# Patient Record
Sex: FEMALE | Race: WHITE | NOT HISPANIC OR LATINO | Employment: FULL TIME | URBAN - METROPOLITAN AREA
[De-identification: names, ages, dates, MRNs, and addresses within clinical notes are randomized per-mention and may not be internally consistent; named-entity substitution may affect disease eponyms.]

---

## 2017-10-17 ENCOUNTER — OFFICE VISIT (OUTPATIENT)
Dept: SLEEP MEDICINE | Facility: CLINIC | Age: 32
End: 2017-10-17
Payer: COMMERCIAL

## 2017-10-17 VITALS
RESPIRATION RATE: 14 BRPM | BODY MASS INDEX: 23.34 KG/M2 | HEART RATE: 81 BPM | HEIGHT: 68 IN | OXYGEN SATURATION: 97 % | SYSTOLIC BLOOD PRESSURE: 125 MMHG | DIASTOLIC BLOOD PRESSURE: 80 MMHG | WEIGHT: 154 LBS

## 2017-10-17 DIAGNOSIS — R40.0 SLEEPINESS: ICD-10-CM

## 2017-10-17 DIAGNOSIS — G47.30 OBSERVED SLEEP APNEA: ICD-10-CM

## 2017-10-17 DIAGNOSIS — R06.83 SNORING: ICD-10-CM

## 2017-10-17 DIAGNOSIS — G47.8 SLEEP PARALYSIS: Primary | ICD-10-CM

## 2017-10-17 PROCEDURE — 99205 OFFICE O/P NEW HI 60 MIN: CPT | Performed by: PHYSICIAN ASSISTANT

## 2017-10-17 NOTE — PROGRESS NOTES
Sleep Consultation:    Date on this visit: 10/17/2017    Tracy Dalton is sent by No ref. provider found for a sleep consultation regarding sleep paralysis.    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 It is worsening. Her medical history is significant for depression and anxiety.     At least once a week, she will feel aware of what is going on around her but can't wake fully and can't move. She is sometimes unresponsive, sometimes talking gibberish. It lasts just a couple of minutes. When she was younger, it lasted longer. She tries to calm herself down by controlling her breathing. She usually wakes up and walks around otherwise she will fall right back into the same feeling. It happens more when she tries to go to bed earlier than usual. She does not usually wake later in the night with this feeling. She has not taken anything to help her sleep. The sleep paralysis happens more when she sleeps on her back, so she avoids that position. In the last few years, it has even occurred when sleeping in other positions. She is sometimes observed to snore during these events. She and her  are planning on starting a family and they are both concerned that she might not be able to wake up to the baby crying.     Tracy goes to sleep at 11:30 PM during the week. She frequently falls asleep on the couch before bedtime. Sleep paralysis happens there infrequently, normally in bed. She starts dozing around 10-11 PM. She wakes up at 5:30 AM with an alarm. She sometimes hits the snooze, but she has to tend to her dogs, so does not usually have an option of sleeping in. She falls asleep in 5 minutes.  Tracy denies difficulty falling asleep.  She wakes up 2-4 times a night for 5 minutes before falling back to sleep.  Tracy wakes up to sleep paralysis or her dogs.  On weekends, Tracy goes to sleep at 1:00 AM.  She wakes up at 6:00 AM to her dogs.  She denies having much trouble getting up at 6 AM. She rarely goes back to bed. She might doze on the couch, but usually her dogs keep her up. She falls asleep in 5 minutes.  Patient gets an average of 4-5 hours of sleep per week night and 5-6 hours on weekends.     Patient does use electronics in bed and read in bed (not long, she falls asleep quickly) and does not watch TV in bed (but it is on while she is falling asleep) and worry in bed about anything usually.     Tracy does not do shift work.  She works day shifts.  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 She lives with her  and 2 dogs. She moved here from [place] about 6.5 years ago.     Tracy does snore most nights and snoring is soft to moderate. Her snoring is mostly related to when she sleeps on her back. Patient does have a regular bed partner. She does have infrequent witnessed apneas with gasp arousals, or she will shout when she wakes. They never sleep separately.  Patient sleeps on her side and stomach. She has occasional gasp arousals (1-2 times per week), denies morning dry mouth, morning headaches (for the most part), morning confusion (unless she is woken in the middle of the night) and restless legs. Tracy denies any bruxism, sleep walking, dream enactment and cataplexy. She has very vivid dreams and sometimes has difficulty distinguishing what was a dream and what was real. She has vivid dreams soon after falling asleep. They are often stressful or nightmares. She sometimes feels she has been sleeping for hours, but it has only been 10 minutes. She often wakes with a start and has hit her . She talks gibberish in her sleep. When she was younger, she thought she heard or felt something coming into her room. That has not happened lately. She says that is because she has dogs and a , so there are other things to focus on.     She had a lot of trouble falling asleep in childhood, but would sleep well once  she fell asleep. She denies having daytime sleepiness as a child. Tracy has anxiety but feels it is much better now, denies difficulty breathing through her nose, claustrophobia, reflux at night, heartburn and depression.  She was on Celexa 3-4 years ago. It did not have an effect on her sleep paralysis.    Tracy has lost 5-10 pounds in the last 2 years.  Patient describes themself as either a morning or night person.  She would prefer to go to sleep at 12:00 AM and wake up at 6:30 AM.  Patient's [place] Sleepiness score 14/24 consistent with moderate daytime sleepiness.  She feels sleepiness in the daytime is not too much of an issue unless she had a bad night. She feels she is more fatigued if she gets over 6 hours of sleep.    Tracy denies taking naps. She used to sleep 20-30 minutes. Sometimes she dreams during naps. She will doze quickly whenever she lays down. She will fall asleep if laying down and reading or watching TV at any time of day.  She usually feels worse after naps. She does not like napping. She takes some inadvertant naps, mostly if she has a particularly disrupted night of sleep.  She denies dozing while driving unless she is driving several hours. Patient was counseled on the importance of driving while alert, to pull over if drowsy, or nap before getting into the vehicle if sleepy.  She uses 4-5 cups/day of coffee. Last caffeine intake can be as late as bedtime.    Allergies:    No Known Allergies    Medications:    No current outpatient prescriptions on file.       Problem List:  Patient Active Problem List    Diagnosis Date Noted     Major depressive disorder, recurrent episode, moderate (H) 11/19/2015     Priority: Medium     Anxiety 11/19/2015     Priority: Medium     CARDIOVASCULAR SCREENING; LDL GOAL LESS THAN 160 10/22/2015     Priority: Medium        Past Medical/Surgical History:  Past Medical History:   Diagnosis Date     Depressive disorder     and anxiety     Past Surgical  History:   Procedure Laterality Date     wisdom teeth         Social History:  Social History     Social History     Marital status:      Spouse name: N/A     Number of children: N/A     Years of education: N/A     Occupational History     Not on file.     Social History Main Topics     Smoking status: Former Smoker     Start date: 1/1/2008     Smokeless tobacco: Never Used      Comment: 1 ppw     Alcohol use Yes      Comment: 2-3 drinks on weekends     Drug use: No     Sexual activity: Yes     Partners: Male     Birth control/ protection: None     Other Topics Concern     Bike Helmet Yes     Seat Belt Yes     Social History Narrative       Family History:  Family History   Problem Relation Age of Onset     Type 1 Diabetes Other      Clotting Disorder (Unknown) Father      Coronary Artery Disease No family hx of      Hypertension No family hx of      CEREBROVASCULAR DISEASE No family hx of        Review of Systems:  A complete review of systems reviewed by me is negative with the exeption of what has been mentioned in the history of present illness.  CONSTITUTIONAL: NEGATIVE for weight gain/loss, fever, chills, sweats or night sweats, drug allergies.  EYES: NEGATIVE for changes in vision, blind spots, double vision.  ENT: NEGATIVE for ear pain, sore throat, sinus pain, post-nasal drip, runny nose, bloody nose  CARDIAC: NEGATIVE for fast heartbeats or fluttering in chest, chest pain or pressure, breathlessness when lying flat, swollen legs or swollen feet.  NEUROLOGIC: NEGATIVE headaches, weakness or numbness in the arms or legs.  DERMATOLOGIC: NEGATIVE for rashes, new moles or change in mole(s)  PULMONARY: NEGATIVE SOB at rest, SOB with activity, dry cough, productive cough, coughing up blood, wheezing or whistling when breathing.    GASTROINTESTINAL: NEGATIVE for vomitting, fat or grease in stools, constipation, abdominal pain, bowel movements black in color or blood noted.  GASTROINTESTINAL:  POSITIVE for   "nausea and loose or watery stools  GENITOURINARY: NEGATIVE for pain during urination, blood in urine, urinating more frequently than usual.  GENITOURINARY:  POSITIVE for  irregular menstrual periods  MUSCULOSKELETAL: NEGATIVE for muscle pain, bone or joint pain, swollen joints.  ENDOCRINE: NEGATIVE for increased thirst or urination, diabetes.  LYMPHATIC: NEGATIVE for swollen lymph nodes, lumps or bumps in the breasts or nipple discharge.  MENTAL HEALTH: POSITIVE for anxiety    Physical Examination:  Vitals: /80  Pulse 81  Resp 14  Ht 1.727 m (5' 8\")  Wt 69.9 kg (154 lb)  SpO2 97%  BMI 23.42 kg/m2  BMI= Body mass index is 23.42 kg/(m^2).    Neck Cir (cm): 35 cm    [place] Total Score 10/17/2017   Total score - [place] 14       GENERAL APPEARANCE: healthy, alert, no distress and cooperative  EYES: Eyes grossly normal to inspection, PERRL, conjunctivae and sclerae normal and lids and lashes normal  HENT: nose and mouth without ulcers or lesions, oropharynx small/crowded and tongue base enlarged, nasal septum slightly deviated  NECK: no adenopathy, no asymmetry, masses, or scars, thyroid normal to palpation and trachea midline and normal to palpation  RESP: lungs clear to auscultation - no rales, rhonchi or wheezes  CV: regular rates and rhythm, normal S1 S2, no S3 or S4, no murmur, click or rub and no irregular beats  LYMPHATICS: normal ant/post cervical and supraclavicular nodes  MS: extremities normal- no gross deformities noted  NEURO: Normal strength and tone, mentation intact, speech normal and cranial nerves 2-12 intact  Mallampati Class: IV.  Tonsillar Stage: 1  hidden by pillars.    Impression/Plan:    (G47.8) Sleep paralysis  (primary encounter diagnosis)  Comment: She has sleep paralysis at sleep onset 1-2 times per week since childhood. It is more likely when she sleeps supine, which she tries to avoid. It also occurs if she tries to go to bed early.  In the last 3 months, it has been happening " in other positions. She sometimes but not always is observed to snore and terminate the sleep paralysis with a gasp awakening. She says the sleep paralysis was not different when she was on an SSRI. She is planning on starting a family and is concerned about her ability to respond to a child.  Plan: She was advised to try to get more sleep by getting to bed 15 minutes earlier every week until she either starts having trouble falling or staying asleep, or her sleepiness and sleep paralysis are improved. I also advised her to try to reduce external sources of arousal. Reduce caffeine and alcohol within 6 hours of bedtime, keep the dogs out of the bedroom. Avoid having the TV on at bedtime. We discussed that there are no medications for sleep paralysis. Treatment for her will be aimed at reducing sleep deprivation and arousals.    (R06.83) Snoring, (G47.30) Observed sleep apnea, (R40.0) Sleepiness  Comment: Tracy snores moderately loud, primarily when she sleeps on her back. She has been observed to have gasp awakenings sometimes when she wakes from a sleep paralysis event. She seems to have some sleepiness, although sometimes seems to downplay her sleepiness. When I ask her if she feels tired or fatigued, she says she is not really bothered by either. However, Her ESS is 14/24 and she says she will fall asleep quickly if she lays down to read or watch TV. Her sleepiness could very well be a consequence of only getting about 4-6 hours of sleep per night. However, she says she feels worse if she sleeps over 6 hours. She says she goes into vivid dreams/nightmares soon after falling asleep, which raises suspicion for narcolepsy. She does not have cataplexy. Her airway is small and crowded.  She does not have other risk factors in the STOP BANG scale; no HTN, BMI is normal at 23, age is 32, neck circumference is normal at 35 cm, gender is female. She has no family history of FLORA.  Plan: We discussed the best study to  evaluate her full symptom complex. Given her insufficient sleep, it is hard to say whether she is more likely to have narcolepsy or FLORA, so I will defer evaluation while we collect a little more data. I have asked her to have her  pay closer attention to her breathing. She will work on extending her sleep gradually to see if her sleepiness and sleep paralysis improve. If abnormalities persist, we will consider a sleep study.     Literature provided regarding sleep hygiene.      She will follow up with me in approximately two weeks after her sleep study has been competed to review the results and discuss plan of care.       Polysomnography reviewed.  Obstructive sleep apnea reviewed.  Complications of untreated sleep apnea were reviewed.  60 minutes was spent during this visit, over 50% in counseling and coordination of care.    Bennett Goltz, PA-C    CC: No ref. provider found

## 2017-10-17 NOTE — NURSING NOTE
"Chief Complaint   Patient presents with     Sleep Problem     Sleep paralysis       Initial /80  Pulse 81  Resp 14  Ht 1.727 m (5' 8\")  Wt 69.9 kg (154 lb)  SpO2 97%  BMI 23.42 kg/m2 Estimated body mass index is 23.42 kg/(m^2) as calculated from the following:    Height as of this encounter: 1.727 m (5' 8\").    Weight as of this encounter: 69.9 kg (154 lb).  Medication Reconciliation: complete   Neck 35cm  ESS 14  Moira Crystal MA      "

## 2017-10-17 NOTE — MR AVS SNAPSHOT
After Visit Summary   10/17/2017    Tracy Dalton    MRN: [id number]           Patient Information     Date Of Birth          1985        Visit Information        Provider Department      10/17/2017 10:00 AM Goltz, Bennett Ezra, PA-C [facility]        Today's Diagnoses     Sleep paralysis    -  1    Snoring        Observed sleep apnea        Sleepiness          Care Instructions    Try to avoid things that would cause arousals from sleep (noise, TV, dogs, late caffeine, alcohol).  Work on increasing your total sleep intake. Each week, move your bedtime 15 minutes earlier until you either have more trouble falling asleep or are having no more sleep paralysis. I would like to have you getting at least 7 hours of sleep per night.     General recommendations for sleep problems (Insomnia)  Allow 2-4 weeks to see results     Establish a regular sleep schedule    Most people only need 7-8 hours of sleep.  Don't be in bed longer than you need     to sleep or you will end up spending more time awake in bed. This trains your    brain to think of the bed as a place to not sleep.  Go to bed at same time each night   Get up at same time each day - Set an alarm everyday (even weekends). This is one of    the most important tips. It prevents you from relying on your insomnia to get you    up on time for your day. That actually reinforces insomnia. It also will help your    body get into a pattern where you start feeling tired at a consistent time each    night.  The body functions best when you keep a consistent routine.  Avoid sleeping-in and napping. Anytime you sleep during the day, you will be less tired at    night. You may be tired enough to fall asleep, but you will wake more in the    middle of the night because you will have met your sleep need before the night is    done.   Cut down time in bed (if not asleep, get up)- Use your bed only for sleep and sex    Anytime you spend time  in bed doing activities other than sleep (reading,    watching TV, working, playing on the computer or phone, or even just laying in    bed trying to sleep), you are training  your brain to think of the bed as a place to    do activities other than sleep. If you are not falling asleep within 20-30 minutes,    get out of bed. While out of bed, avoid bright lights. Avoid work or chores. Being    productive in the middle of the night reinforces waking up at night. Find relaxing,    not particularly entertaining activities like reading, listening to music, or relaxation    exercises. Go back to bed if you start feeling groggy, or after about 30 minutes,    even if not feeling very tired. Sometimes, just getting out of bed stops the pattern    of getting frustrated about laying in bed not sleeping, and that can help you fall    asleep.   Avoid trying to force yourself to sleep- sleep is not like everything else. The harder you    work at most things, the more you can accomplish. The harder you work at    sleep, the less you will sleep.     Make the bedroom comfortable - quiet, dark and cool are better. Consider ear plugs    (silicon). Use dark blinds or wear an eye mask if needed     Make a relaxing routine prior to bedtime  Relaxation exercises:   Progressive muscle relaxation: Relax each muscle group individually    Begin with your feet, flex, then relax. Try to imagine your feet feeling heavy and sinking into the bed. Move to your calves, do the same thing. Work through each muscle group toward your head.    Relaxing Mental Imagery: Try to imagine a trip that you took and found relaxing, or imagine a day at the beach. Try to walk yourself through the day in your mind as if you were dreaming it. Try to imagine sensing the different experiences, such as feeling sand between your toes, the heat of the sun on your skin, seeing the waves crashing the shore, the smell of the salt water, etc.     Deal with your worries  before bedtime    Set aside a worry time around dinner time for 10-15 minutes. Write down the    things that are on your mind. Plan time in the coming days to address those    issues. Brainstorm ideas on how you will deal with them. Try to identify issues    that are out of your control, and try to let those issues go.  Listen to relaxation tapes   Classical Music or Nature sounds   Back Massage   Get regular exercise each day (at least 1-2 hours before bedtime)   Take medications only as directed   Eat a light bedtime snack or warm drink   Warm milk   Warm herbal tea (non-caffeinated)       Things to avoid   No overstimulating activities just before bed   No competitive games before bedtime   No exciting television programs before bedtime   Avoid caffeine after lunchtime   Avoid chocolate   Do not use alcohol to induce sleep (worsens Insomnia)   Do not take someone else's sleeping pills   Do not look at the clock when awakening   Do not turn on light when getting up to use bathroom, use a nightlight     Online Programs     www.Captual (pronounced shut eye). There is a fee for this program. Enter the code  [place]  if you decide to enroll in this program.      www.sleepIO.com (pronounced sleep ee oh). There is a fee for this program. Enter the code  [place]  if you decide to enroll in this program.     Suggested Resources  Insomnia Treatment Books     Overcoming Insomnia by Jeovany Vides and Ella Feldman (2008)    No More Sleepless Nights by Peter Javier and Mirela Levine (1996)    Say Paulie to Insomnia by Ravin Addison (2009)    The Insomnia Workbook by Nathalia Oneal and Deacon Napoles (2009)    The Insomnia Answer by Keny Mayorga and Gus Pnoce (2006)      Stress Management and Relaxation Books    The Relaxation and Stress Reduction Workbook by Mabel Hidalgo, Amada Smith and Zach Jasso (2008)    Stress Management Workbook: Techniques and Self-Assessment Procedures by Mariangel  LINDA Ricardo and Willie Solis (1997)    A Mindfulness-Based Stress Reduction Workbook by Elio Maria and Judy Rivero (2010)    The Complete Stress Management Workbook by Amrit Montgomery, Delroy Sheffield and Juan Pablo Hirsch (1996)    Assert Yourself by Alva Traore and Daniele Traore (1977)    Relaxation Resources for Computer Download   These websites offer resources to help you relax. This list is for information only. [facility] is not responsible for the quality of services or the actions of any person or organization.  Progressive Muscle Relaxation (PMR):     http://www.GlobalPrint Systems/progressive-muscle-relaxation-exercise.html     http://studentsupport.[facility]/counseling/resources/self-help/relaxation-and-stress-management/   Deep Breathing Exercises:    http://www.GlobalPrint Systems/breathing-awareness.html     Meditation:     wwwChiasma    www.Blue Cod TechnologiesmeditationMediatonic Gamessite.[employer] You may have to pay for some of these resources.    Guided Imagery:    http://www.GlobalPrint Systems/guided-imagery-scripts.html     http://[employer]/library/wqzyxtpnea-psuqob-gsnjpwn/     Counseling / Behavioral Health  [facility]  Visit [email or web address] or call [phone] to find a clinic close to you.  Or call [phone] for [facility].    Dream Rehearsal:  Write down an average nightmare. Then rewrite the dream changing the negative aspects into positive aspects. Alternatively, write an entirely new dream.  Spend 1-2 minutes, a few times per day, imagining the pleasant dream.  Walk yourself through the entire dream in your mind. Do this before bed as well as other times of the day. Create new dreams no more than twice per week.            Follow-ups after your visit        Follow-up notes from your care team     Return in about 2 weeks (around 10/31/2017) for Sleep Study Review.      Your next 10 appointments already scheduled      "Nov 20, 2017  8:30 AM CST   Return Sleep Patient with Bennett Ezra Goltz, PA-C   [facility] ([facility])    [address] [address] [phone]              Who to contact     If you have questions or need follow up information about today's clinic visit or your schedule please contact [facility] directly at [phone].  Normal or non-critical lab and imaging results will be communicated to you by JobScouthart, letter or phone within 4 business days after the clinic has received the results. If you do not hear from us within 7 days, please contact the clinic through [employer] or phone. If you have a critical or abnormal lab result, we will notify you by phone as soon as possible.  Submit refill requests through C[employer]R or call your pharmacy and they will forward the refill request to us. Please allow 3 business days for your refill to be completed.          Additional Information About Your Visit        C[employer]R Information     C[employer]R gives you secure access to your electronic health record. If you see a primary care provider, you can also send messages to your care team and make appointments. If you have questions, please call your primary care clinic.  If you do not have a primary care provider, please call [phone] and they will assist you.        Care EveryWhere ID     This is your Care EveryWhere ID. This could be used by other organizations to access your [facility] medical records  [id number]        Your Vitals Were     Pulse Respirations Height Pulse Oximetry BMI (Body Mass Index)       81 14 1.727 m (5' 8\") 97% 23.42 kg/m2        Blood Pressure from Last 3 Encounters:   10/17/17 125/80   01/08/16 106/80   11/05/15 114/66    Weight from Last 3 Encounters:   10/17/17 69.9 kg (154 lb)   01/08/16 72.1 kg (159 lb)   11/05/15 71 kg (156 lb 8 oz)              Today, you had the following     No orders found for display       "   Today's Medication Changes          These changes are accurate as of: 10/17/17 11:27 AM.  If you have any questions, ask your nurse or doctor.               Stop taking these medicines if you haven't already. Please contact your care team if you have questions.     citalopram 20 MG tablet   Commonly known as:  celeXA   Stopped by:  Goltz, Bennett Ezra, PA-C                    Primary Care Provider Office Phone # Fax #    Clara Hu Guerrero PA-C [phone] [address]  [facility] [id number]        Equal Access to Services     [facility]: Hadii aad ku hadasho Sonima, waaxda luqadaha, qaybta kaalmada adeosmel, lara rosales . So [facility] [phone].    ATENCIÓN: Si habla español, tiene a iraheta disposición servicios gratuitos de asistencia lingüística. [facility] [phone].    We comply with applicable federal civil rights laws and [place] laws. We do not discriminate on the basis of race, color, national origin, age, disability, sex, sexual orientation, or gender identity.            Thank you!     Thank you for choosing [facility]  for your care. Our goal is always to provide you with excellent care. Hearing back from our patients is one way we can continue to improve our services. Please take a few minutes to complete the written survey that you may receive in the mail after your visit with us. Thank you!             Your Updated Medication List - Protect others around you: Learn how to safely use, store and throw away your medicines at www.disposemymeds.org.      Notice  As of 10/17/2017 11:27 AM    You have not been prescribed any medications.

## 2017-10-17 NOTE — PATIENT INSTRUCTIONS
Try to avoid things that would cause arousals from sleep (noise, TV, dogs, late caffeine, alcohol).  Work on increasing your total sleep intake. Each week, move your bedtime 15 minutes earlier until you either have more trouble falling asleep or are having no more sleep paralysis. I would like to have you getting at least 7 hours of sleep per night.     General recommendations for sleep problems (Insomnia)  Allow 2-4 weeks to see results     Establish a regular sleep schedule    Most people only need 7-8 hours of sleep.  Don't be in bed longer than you need     to sleep or you will end up spending more time awake in bed. This trains your    brain to think of the bed as a place to not sleep.  Go to bed at same time each night   Get up at same time each day - Set an alarm everyday (even weekends). This is one of    the most important tips. It prevents you from relying on your insomnia to get you    up on time for your day. That actually reinforces insomnia. It also will help your    body get into a pattern where you start feeling tired at a consistent time each    night.  The body functions best when you keep a consistent routine.  Avoid sleeping-in and napping. Anytime you sleep during the day, you will be less tired at    night. You may be tired enough to fall asleep, but you will wake more in the    middle of the night because you will have met your sleep need before the night is    done.   Cut down time in bed (if not asleep, get up)- Use your bed only for sleep and sex    Anytime you spend time in bed doing activities other than sleep (reading,    watching TV, working, playing on the computer or phone, or even just laying in    bed trying to sleep), you are training  your brain to think of the bed as a place to    do activities other than sleep. If you are not falling asleep within 20-30 minutes,    get out of bed. While out of bed, avoid bright lights. Avoid work or chores. Being    productive in the middle of  the night reinforces waking up at night. Find relaxing,    not particularly entertaining activities like reading, listening to music, or relaxation    exercises. Go back to bed if you start feeling groggy, or after about 30 minutes,    even if not feeling very tired. Sometimes, just getting out of bed stops the pattern    of getting frustrated about laying in bed not sleeping, and that can help you fall    asleep.   Avoid trying to force yourself to sleep- sleep is not like everything else. The harder you    work at most things, the more you can accomplish. The harder you work at    sleep, the less you will sleep.     Make the bedroom comfortable - quiet, dark and cool are better. Consider ear plugs    (silicon). Use dark blinds or wear an eye mask if needed     Make a relaxing routine prior to bedtime  Relaxation exercises:   Progressive muscle relaxation: Relax each muscle group individually    Begin with your feet, flex, then relax. Try to imagine your feet feeling heavy and sinking into the bed. Move to your calves, do the same thing. Work through each muscle group toward your head.    Relaxing Mental Imagery: Try to imagine a trip that you took and found relaxing, or imagine a day at the beach. Try to walk yourself through the day in your mind as if you were dreaming it. Try to imagine sensing the different experiences, such as feeling sand between your toes, the heat of the sun on your skin, seeing the waves crashing the shore, the smell of the salt water, etc.     Deal with your worries before bedtime    Set aside a worry time around dinner time for 10-15 minutes. Write down the    things that are on your mind. Plan time in the coming days to address those    issues. Brainstorm ideas on how you will deal with them. Try to identify issues    that are out of your control, and try to let those issues go.  Listen to relaxation tapes   Classical Music or Nature sounds   Back Massage   Get regular exercise each day  (at least 1-2 hours before bedtime)   Take medications only as directed   Eat a light bedtime snack or warm drink   Warm milk   Warm herbal tea (non-caffeinated)       Things to avoid   No overstimulating activities just before bed   No competitive games before bedtime   No exciting television programs before bedtime   Avoid caffeine after lunchtime   Avoid chocolate   Do not use alcohol to induce sleep (worsens Insomnia)   Do not take someone else's sleeping pills   Do not look at the clock when awakening   Do not turn on light when getting up to use bathroom, use a nightlight     Online Programs     www.SHUTMobiliBuy (pronounced shut eye). There is a fee for this program. Enter the code  [place]  if you decide to enroll in this program.      www.sleepIO.com (pronounced sleep ee oh). There is a fee for this program. Enter the code  [place]  if you decide to enroll in this program.     Suggested Resources  Insomnia Treatment Books     Overcoming Insomnia by Jeovany Vides and Ella Feldman (2008)    No More Sleepless Nights by Peter Javier and Mirela Levine (1996)    Say Paulie to Insomnia by Ravin Addison (2009)    The Insomnia Workbook by Nathalia Oneal and Deacon Napoles (2009)    The Insomnia Answer by Keny Mayorga and Gus Ponce (2006)      Stress Management and Relaxation Books    The Relaxation and Stress Reduction Workbook by Mabel Hidalgo, Amada Smith and Zach Jsaso (2008)    Stress Management Workbook: Techniques and Self-Assessment Procedures by Mariangel Ricardo and Willie Solis (1997)    A Mindfulness-Based Stress Reduction Workbook by Elio Maria and Judy Rivero (2010)    The Complete Stress Management Workbook by Amrit Montgomery, Delroy Sheffield and Juan Pablo Hirsch (1996)    Assert Yourself by Alva Traore and Daniele Traore (1977)    Relaxation Resources for Computer Download   These websites offer resources to help you relax. This list is for information  only. [facility] is not responsible for the quality of services or the actions of any person or organization.  Progressive Muscle Relaxation (PMR):     http://www.anfix/progressive-muscle-relaxation-exercise.html     http://studentsupport.[facility]/counseling/resources/self-help/relaxation-and-stress-management/   Deep Breathing Exercises:    http://www.anfix/breathing-awareness.html     Meditation:     wwwAxerra Networks    www.DomobguidedSensbeatmeditationSensbeatsite.com You may have to pay for some of these resources.    Guided Imagery:    http://www.anfix/guided-imagery-scripts.html     http://[employer]/library/onlmexznhk-uisfja-zivbqpy/     Counseling / Behavioral Health  [facility]  Visit [email or web address] or call [phone] to find a clinic close to you.  Or call [phone] for [facility].    Dream Rehearsal:  Write down an average nightmare. Then rewrite the dream changing the negative aspects into positive aspects. Alternatively, write an entirely new dream.  Spend 1-2 minutes, a few times per day, imagining the pleasant dream.  Walk yourself through the entire dream in your mind. Do this before bed as well as other times of the day. Create new dreams no more than twice per week.

## 2017-11-20 ENCOUNTER — OFFICE VISIT (OUTPATIENT)
Dept: SLEEP MEDICINE | Facility: CLINIC | Age: 32
End: 2017-11-20
Payer: COMMERCIAL

## 2017-11-20 VITALS
RESPIRATION RATE: 14 BRPM | DIASTOLIC BLOOD PRESSURE: 80 MMHG | HEART RATE: 94 BPM | SYSTOLIC BLOOD PRESSURE: 113 MMHG | BODY MASS INDEX: 23.1 KG/M2 | OXYGEN SATURATION: 95 % | HEIGHT: 68 IN | WEIGHT: 152.4 LBS

## 2017-11-20 DIAGNOSIS — G47.8 SLEEP PARALYSIS: Primary | ICD-10-CM

## 2017-11-20 DIAGNOSIS — R06.83 SNORING: ICD-10-CM

## 2017-11-20 DIAGNOSIS — R40.0 SLEEPINESS: ICD-10-CM

## 2017-11-20 PROCEDURE — 99214 OFFICE O/P EST MOD 30 MIN: CPT | Performed by: PHYSICIAN ASSISTANT

## 2017-11-20 NOTE — NURSING NOTE
"Chief Complaint   Patient presents with     Sleep Problem     f/u sleep paralysis       Initial /80  Pulse 94  Resp 14  Ht 1.727 m (5' 8\")  Wt 69.1 kg (152 lb 6.4 oz)  SpO2 95%  BMI 23.17 kg/m2 Estimated body mass index is 23.17 kg/(m^2) as calculated from the following:    Height as of this encounter: 1.727 m (5' 8\").    Weight as of this encounter: 69.1 kg (152 lb 6.4 oz).  Medication Reconciliation: complete     ESS 12  Chary Ortiz    "

## 2017-11-20 NOTE — MR AVS SNAPSHOT
After Visit Summary   11/20/2017    Tracy Dalton    MRN: [id number]           Patient Information     Date Of Birth          1985        Visit Information        Provider Department      11/20/2017 8:30 AM Goltz, Bennett Ezra, PA-C [facility] Instructions    Download the SnoreLab moisés on your phone and let me know if it records any gasps, snorts or pauses in breathing.    Continue to work on getting 8 hours of sleep on a regular basis. Try to minimize things that cause disruption in your sleep.    Your BMI is Body mass index is 23.17 kg/(m^2).  Weight management is a personal decision.  If you are interested in exploring weight loss strategies, the following discussion covers the approaches that may be successful. Body mass index (BMI) is one way to tell whether you are at a healthy weight, overweight, or obese. It measures your weight in relation to your height.  A BMI of 18.5 to 24.9 is in the healthy range. A person with a BMI of 25 to 29.9 is considered overweight, and someone with a BMI of 30 or greater is considered obese. More than two-thirds of American adults are considered overweight or obese.  Being overweight or obese increases the risk for further weight gain. Excess weight may lead to heart disease and diabetes.  Creating and following plans for healthy eating and physical activity may help you improve your health.  Weight control is part of healthy lifestyle and includes exercise, emotional health, and healthy eating habits. Careful eating habits lifelong are the mainstay of weight control. Though there are significant health benefits from weight loss, long-term weight loss with diet alone may be very difficult to achieve- studies show long-term success with dietary management in less than 10% of people. Attaining a healthy weight may be especially difficult to achieve in those with severe obesity. In some cases, medications, devices and surgical  management might be considered.  What can you do?  If you are overweight or obese and are interested in methods for weight loss, you should discuss this with your provider.     Consider reducing daily calorie intake by 500 calories.     Keep a food journal.     Avoiding skipping meals, consider cutting portions instead.    Diet combined with exercise helps maintain muscle while optimizing fat loss. Strength training is particularly important for building and maintaining muscle mass. Exercise helps reduce stress, increase energy, and improves fitness. Increasing exercise without diet control, however, may not burn enough calories to loose weight.       Start walking three days a week 10-20 minutes at a time    Work towards walking thirty minutes five days a week     Eventually, increase the speed of your walking for 1-2 minutes at time    In addition, we recommend that you review healthy lifestyles and methods for weight loss available through the National Institutes of Health patient information sites:  http://win.niddk.nih.gov/publications/index.htm    And look into health and wellness programs that may be available through your health insurance provider, employer, local community center, or joana club.                Follow-ups after your visit        Who to contact     If you have questions or need follow up information about today's clinic visit or your schedule please contact [facility] directly at [phone].  Normal or non-critical lab and imaging results will be communicated to you by MyChart, letter or phone within 4 business days after the clinic has received the results. If you do not hear from us within 7 days, please contact the clinic through MyChart or phone. If you have a critical or abnormal lab result, we will notify you by phone as soon as possible.  Submit refill requests through WigWag or call your pharmacy and they will forward the refill request to us. Please allow 3  "business days for your refill to be completed.          Additional Information About Your Visit        MyChart Information     ShopKeep POShart gives you secure access to your electronic health record. If you see a primary care provider, you can also send messages to your care team and make appointments. If you have questions, please call your primary care clinic.  If you do not have a primary care provider, please call [phone] and they will assist you.        Care EveryWhere ID     This is your Care EveryWhere ID. This could be used by other organizations to access your [facility] medical records  [id number]        Your Vitals Were     Pulse Respirations Height Pulse Oximetry BMI (Body Mass Index)       94 14 1.727 m (5' 8\") 95% 23.17 kg/m2        Blood Pressure from Last 3 Encounters:   11/20/17 113/80   10/17/17 125/80   01/08/16 106/80    Weight from Last 3 Encounters:   11/20/17 69.1 kg (152 lb 6.4 oz)   10/17/17 69.9 kg (154 lb)   01/08/16 72.1 kg (159 lb)              Today, you had the following     No orders found for display       Primary Care Provider Office Phone # Fax #    Clara Guerrero PA-C [phone]       [facility] [place], [place] [id number]        Equal Access to Services     ERICA SULLIVAN : Hadii aad ku hadasho Soomaali, waaxda luqadaha, qaybta kaalmada adeegyada, waxay idiin hayaan alejandro rosales . So [facility] [phone].    ATENCIÓN: Si habla español, tiene a iraheta disposición servicios gratuitos de asistencia lingüística. Llame al [phone].    We comply with applicable federal civil rights laws and [place] laws. We do not discriminate on the basis of race, color, national origin, age, disability, sex, sexual orientation, or gender identity.            Thank you!     Thank you for choosing [facility]  for your care. Our goal is always to provide you with excellent care. Hearing back from our patients is one way we can continue to improve " our services. Please take a few minutes to complete the written survey that you may receive in the mail after your visit with us. Thank you!             Your Updated Medication List - Protect others around you: Learn how to safely use, store and throw away your medicines at www.disposemymeds.org.      Notice  As of 11/20/2017  9:08 AM    You have not been prescribed any medications.

## 2017-12-31 ENCOUNTER — HEALTH MAINTENANCE LETTER (OUTPATIENT)
Age: 32
End: 2017-12-31

## 2018-04-01 LAB — PAP SMEAR - HIM PATIENT REPORTED: NEGATIVE

## 2018-04-20 ENCOUNTER — TRANSFERRED RECORDS (OUTPATIENT)
Dept: HEALTH INFORMATION MANAGEMENT | Facility: CLINIC | Age: 33
End: 2018-04-20

## 2018-07-09 ENCOUNTER — OFFICE VISIT (OUTPATIENT)
Dept: FAMILY MEDICINE | Facility: CLINIC | Age: 33
End: 2018-07-09
Payer: COMMERCIAL

## 2018-07-09 VITALS
OXYGEN SATURATION: 97 % | HEART RATE: 75 BPM | TEMPERATURE: 98.7 F | DIASTOLIC BLOOD PRESSURE: 74 MMHG | WEIGHT: 159 LBS | BODY MASS INDEX: 24.18 KG/M2 | SYSTOLIC BLOOD PRESSURE: 108 MMHG

## 2018-07-09 DIAGNOSIS — R10.13 EPIGASTRIC DISCOMFORT: ICD-10-CM

## 2018-07-09 DIAGNOSIS — F41.9 ANXIETY: ICD-10-CM

## 2018-07-09 DIAGNOSIS — R20.9 DISTURBANCE OF SKIN SENSATION: Primary | ICD-10-CM

## 2018-07-09 DIAGNOSIS — Z78.9 ALCOHOL USE: ICD-10-CM

## 2018-07-09 LAB
BASOPHILS # BLD AUTO: 0 10E9/L (ref 0–0.2)
BASOPHILS NFR BLD AUTO: 0.1 %
DIFFERENTIAL METHOD BLD: ABNORMAL
EOSINOPHIL # BLD AUTO: 0.1 10E9/L (ref 0–0.7)
EOSINOPHIL NFR BLD AUTO: 1 %
ERYTHROCYTE [DISTWIDTH] IN BLOOD BY AUTOMATED COUNT: 12.2 % (ref 10–15)
HCT VFR BLD AUTO: 40 % (ref 35–47)
HGB BLD-MCNC: 13.6 G/DL (ref 11.7–15.7)
LYMPHOCYTES # BLD AUTO: 1.7 10E9/L (ref 0.8–5.3)
LYMPHOCYTES NFR BLD AUTO: 23.5 %
MCH RBC QN AUTO: 34.7 PG (ref 26.5–33)
MCHC RBC AUTO-ENTMCNC: 34 G/DL (ref 31.5–36.5)
MCV RBC AUTO: 102 FL (ref 78–100)
MONOCYTES # BLD AUTO: 0.6 10E9/L (ref 0–1.3)
MONOCYTES NFR BLD AUTO: 8.3 %
NEUTROPHILS # BLD AUTO: 4.7 10E9/L (ref 1.6–8.3)
NEUTROPHILS NFR BLD AUTO: 67.1 %
PLATELET # BLD AUTO: 262 10E9/L (ref 150–450)
RBC # BLD AUTO: 3.92 10E12/L (ref 3.8–5.2)
VIT B12 SERPL-MCNC: 674 PG/ML (ref 193–986)
WBC # BLD AUTO: 7 10E9/L (ref 4–11)

## 2018-07-09 PROCEDURE — 80076 HEPATIC FUNCTION PANEL: CPT | Performed by: NURSE PRACTITIONER

## 2018-07-09 PROCEDURE — 99214 OFFICE O/P EST MOD 30 MIN: CPT | Performed by: NURSE PRACTITIONER

## 2018-07-09 PROCEDURE — 36415 COLL VENOUS BLD VENIPUNCTURE: CPT | Performed by: NURSE PRACTITIONER

## 2018-07-09 PROCEDURE — 84443 ASSAY THYROID STIM HORMONE: CPT | Performed by: NURSE PRACTITIONER

## 2018-07-09 PROCEDURE — 85025 COMPLETE CBC W/AUTO DIFF WBC: CPT | Performed by: NURSE PRACTITIONER

## 2018-07-09 PROCEDURE — 82607 VITAMIN B-12: CPT | Performed by: NURSE PRACTITIONER

## 2018-07-09 NOTE — PATIENT INSTRUCTIONS
Begin taking vitamin B supplement  Take zantac 150mg twice a day  Have the blood work done and I will send the results to you with any additional instruction

## 2018-07-09 NOTE — PROGRESS NOTES
SUBJECTIVE:   Tracy Dalton is a 33 year old female who presents to clinic today for the following health issues:      Musculoskeletal problem/pain      Duration: 1 month    Description    Location: right abdomen     Intensity:  mild, moderate    Accompanying signs and symptoms: none, no N/V, no diarrhea, no lss of appetite    History; excess alcohol consumption 4-5 beverages nightly , stopped 10 days ago   Previous similar problem: no   Previous evaluation:  none    Precipitating or alleviating factors:  Trauma or overuse: no   Aggravating factors include: none    Therapies tried and outcome: nothing      Tingling in hands      Duration: 1 month    Description (location/character/radiation): both hands, no pain no weakness, no neck pain     Intensity:  mild    Accompanying signs and symptoms: None    History (similar episodes/previous evaluation): None    Precipitating or alleviating factors: None    Therapies tried and outcome: None    Not present right now       Problem list and histories reviewed & adjusted, as indicated.  Additional history: as documented    Patient Active Problem List   Diagnosis     CARDIOVASCULAR SCREENING; LDL GOAL LESS THAN 160     Major depressive disorder, recurrent episode, moderate (H)     Anxiety     Past Surgical History:   Procedure Laterality Date     wisdom teeth         Social History   Substance Use Topics     Smoking status: Former Smoker     Start date: 1/1/2008     Smokeless tobacco: Never Used      Comment: 1 ppw     Alcohol use Yes      Comment: 2-3 drinks on weekends     Family History   Problem Relation Age of Onset     Type 1 Diabetes Other      Clotting Disorder (Unknown) Father      Coronary Artery Disease No family hx of      Hypertension No family hx of      Cerebrovascular Disease No family hx of          Current Outpatient Prescriptions   Medication Sig Dispense Refill     ranitidine (ZANTAC) 150 MG tablet Take 1 tablet (150 mg) by mouth 2 times daily 60  tablet 1     No Known Allergies    Reviewed and updated as needed this visit by clinical staff  Meds  Problems       Reviewed and updated as needed this visit by Provider  Meds  Problems         ROS:  Constitutional, HEENT, cardiovascular, pulmonary, gi and gu systems are negative, except as otherwise noted.    OBJECTIVE:     /74 (BP Location: Left arm, Patient Position: Sitting, Cuff Size: Adult Regular)  Pulse 75  Temp 98.7  F (37.1  C) (Oral)  Wt 159 lb (72.1 kg)  SpO2 97%  BMI 24.18 kg/m2  Body mass index is 24.18 kg/(m^2).  GENERAL: healthy, alert and no distress  EYES: Eyes grossly normal to inspection, PERRL and conjunctivae and sclerae normal  HENT: ear canals and TM's normal, nose and mouth without ulcers or lesions  NECK: no adenopathy, no asymmetry, masses, or scars and thyroid normal to palpation  RESP: lungs clear to auscultation - no rales, rhonchi or wheezes  CV: regular rate and rhythm, normal S1 S2, no S3 or S4, no murmur, click or rub, no peripheral edema and peripheral pulses strong  ABDOMEN: soft, nontender, no hepatosplenomegaly, no masses and bowel sounds normal  MS: no gross musculoskeletal defects noted, no edema  NEURO: Normal strength and tone and sensation of extremities, negative phalens , mentation intact and speech normal  PSYCH: mentation appears normal, affect normal/bright    Diagnostic Test Results:  Results for orders placed or performed in visit on 07/09/18   CBC with platelets and differential   Result Value Ref Range    WBC 7.0 4.0 - 11.0 10e9/L    RBC Count 3.92 3.8 - 5.2 10e12/L    Hemoglobin 13.6 11.7 - 15.7 g/dL    Hematocrit 40.0 35.0 - 47.0 %     (H) 78 - 100 fl    MCH 34.7 (H) 26.5 - 33.0 pg    MCHC 34.0 31.5 - 36.5 g/dL    RDW 12.2 10.0 - 15.0 %    Platelet Count 262 150 - 450 10e9/L    Diff Method Automated Method     % Neutrophils 67.1 %    % Lymphocytes 23.5 %    % Monocytes 8.3 %    % Eosinophils 1.0 %    % Basophils 0.1 %    Absolute Neutrophil  4.7 1.6 - 8.3 10e9/L    Absolute Lymphocytes 1.7 0.8 - 5.3 10e9/L    Absolute Monocytes 0.6 0.0 - 1.3 10e9/L    Absolute Eosinophils 0.1 0.0 - 0.7 10e9/L    Absolute Basophils 0.0 0.0 - 0.2 10e9/L   Hepatic panel (Albumin, ALT, AST, Bili, Alk Phos, TP)   Result Value Ref Range    Bilirubin Direct 0.2 0.0 - 0.2 mg/dL    Bilirubin Total 0.5 0.2 - 1.3 mg/dL    Albumin 4.0 3.4 - 5.0 g/dL    Protein Total 7.2 6.8 - 8.8 g/dL    Alkaline Phosphatase 55 40 - 150 U/L    ALT 46 0 - 50 U/L    AST 31 0 - 45 U/L   TSH   Result Value Ref Range    TSH 1.70 0.40 - 4.00 mU/L   Vitamin B12   Result Value Ref Range    Vitamin B12 674 193 - 986 pg/mL       ASSESSMENT/PLAN:       ICD-10-CM    1. Disturbance of skin sensation R20.9 TSH     Vitamin B12   2. Epigastric discomfort R10.13 CBC with platelets and differential     ranitidine (ZANTAC) 150 MG tablet   3. Anxiety F41.9    4. Alcohol use Z78.9 Hepatic panel (Albumin, ALT, AST, Bili, Alk Phos, TP)       Patient Instructions   Begin taking vitamin B supplement  Take zantac 150mg twice a day  Have the blood work done and I will send the results to you with any additional instruction         CYR[facility]

## 2018-07-09 NOTE — MR AVS SNAPSHOT
After Visit Summary   7/9/2018    Tracy Dalton    MRN: [id number]           Patient Information     Date Of Birth          1985        Visit Information        Provider Department      7/9/2018 3:00 PM Madeline La APRN CNP [facility]        Today's Diagnoses     Disturbance of skin sensation    -  1    Epigastric discomfort        Anxiety        Alcohol use          Care Instructions    Begin taking vitamin B supplement  Take zantac 150mg twice a day  Have the blood work done and I will send the results to you with any additional instruction             Follow-ups after your visit        Who to contact     If you have questions or need follow up information about today's clinic visit or your schedule please contact [facility] directly at [phone].  Normal or non-critical lab and imaging results will be communicated to you by Exarahart, letter or phone within 4 business days after the clinic has received the results. If you do not hear from us within 7 days, please contact the clinic through Exarahart or phone. If you have a critical or abnormal lab result, we will notify you by phone as soon as possible.  Submit refill requests through Enecsys or call your pharmacy and they will forward the refill request to us. Please allow 3 business days for your refill to be completed.          Additional Information About Your Visit        MyChart Information     Enecsys gives you secure access to your electronic health record. If you see a primary care provider, you can also send messages to your care team and make appointments. If you have questions, please call your primary care clinic.  If you do not have a primary care provider, please call [phone] and they will assist you.        Care EveryWhere ID     This is your Care EveryWhere ID. This could be used by other organizations to access your [facility] medical records  [id number]        Your Vitals Were     Pulse  Temperature Pulse Oximetry BMI (Body Mass Index)          75 98.7  F (37.1  C) (Oral) 97% 24.18 kg/m2         Blood Pressure from Last 3 Encounters:   07/09/18 108/74   11/20/17 113/80   10/17/17 125/80    Weight from Last 3 Encounters:   07/09/18 159 lb (72.1 kg)   11/20/17 152 lb 6.4 oz (69.1 kg)   10/17/17 154 lb (69.9 kg)              We Performed the Following     CBC with platelets and differential     Hepatic panel (Albumin, ALT, AST, Bili, Alk Phos, TP)     TSH     Vitamin B12          Today's Medication Changes          These changes are accurate as of 7/9/18  3:35 PM.  If you have any questions, ask your nurse or doctor.               Start taking these medicines.        Dose/Directions    ranitidine 150 MG tablet   Commonly known as:  ZANTAC   Used for:  Epigastric discomfort   Started by:  Madeline La APRN CNP        Dose:  150 mg   Take 1 tablet (150 mg) by mouth 2 times daily   Quantity:  60 tablet   Refills:  1            Where to get your medicines      These medications were sent to [facility] [address] [place] [facility] [address]  [address] [facility] [address]     Phone:  [phone]     ranitidine 150 MG tablet                Primary Care Provider Office Phone # Fax #    Clara Guerrero PA-C [phone] [address]  [facility] [address]        Equal Access to Services     ERICA SULLIVAN : Lazaro chano Sonima, waaxda luqadaha, qaybta kaalmada ademinyadiego, lara lee ademin cisneros. So [facility] [phone].    ATENCIÓN: Si habla español, tiene a iraheta disposición servicios gratuitos de asistencia lingüística. Bernice al [phone].    We comply with applicable federal civil rights laws and [place] laws. We do not discriminate on the basis of race, color, national origin, age, disability, sex, sexual orientation, or gender identity.            Thank you!     Thank you for choosing [facility]  for your care. Our goal is  always to provide you with excellent care. Hearing back from our patients is one way we can continue to improve our services. Please take a few minutes to complete the written survey that you may receive in the mail after your visit with us. Thank you!             Your Updated Medication List - Protect others around you: Learn how to safely use, store and throw away your medicines at www.disposemymeds.org.          This list is accurate as of 7/9/18  3:35 PM.  Always use your most recent med list.                   Brand Name Dispense Instructions for use Diagnosis    ranitidine 150 MG tablet    ZANTAC    60 tablet    Take 1 tablet (150 mg) by mouth 2 times daily    Epigastric discomfort

## 2018-07-09 NOTE — LETTER
[facility]  [address] [address] [facility]  [address]  [place]  [address]  Tel: [phone]    July 10, 2018    Tracy Dalton  [address] [address]        Dear Ms. Dalton,    Your test results all look good, Tracy.  However it will not do you any harm to take those B vitamins.     If you have any further questions or problems, please contact our office.      Sincerely,    Madeline La, NUSRAT/mw          Enclosure: Lab Results                                            Results for orders placed or performed in visit on 07/09/18   CBC with platelets and differential   Result Value Ref Range    WBC 7.0 4.0 - 11.0 10e9/L    RBC Count 3.92 3.8 - 5.2 10e12/L    Hemoglobin 13.6 11.7 - 15.7 g/dL    Hematocrit 40.0 35.0 - 47.0 %     (H) 78 - 100 fl    MCH 34.7 (H) 26.5 - 33.0 pg    MCHC 34.0 31.5 - 36.5 g/dL    RDW 12.2 10.0 - 15.0 %    Platelet Count 262 150 - 450 10e9/L    Diff Method Automated Method     % Neutrophils 67.1 %    % Lymphocytes 23.5 %    % Monocytes 8.3 %    % Eosinophils 1.0 %    % Basophils 0.1 %    Absolute Neutrophil 4.7 1.6 - 8.3 10e9/L    Absolute Lymphocytes 1.7 0.8 - 5.3 10e9/L    Absolute Monocytes 0.6 0.0 - 1.3 10e9/L    Absolute Eosinophils 0.1 0.0 - 0.7 10e9/L    Absolute Basophils 0.0 0.0 - 0.2 10e9/L   Hepatic panel (Albumin, ALT, AST, Bili, Alk Phos, TP)   Result Value Ref Range    Bilirubin Direct 0.2 0.0 - 0.2 mg/dL    Bilirubin Total 0.5 0.2 - 1.3 mg/dL    Albumin 4.0 3.4 - 5.0 g/dL    Protein Total 7.2 6.8 - 8.8 g/dL    Alkaline Phosphatase 55 40 - 150 U/L    ALT 46 0 - 50 U/L    AST 31 0 - 45 U/L   TSH   Result Value Ref Range    TSH 1.70 0.40 - 4.00 mU/L   Vitamin B12   Result Value Ref Range    Vitamin B12 674 193 - 986 pg/mL

## 2018-07-10 LAB
ALBUMIN SERPL-MCNC: 4 G/DL (ref 3.4–5)
ALP SERPL-CCNC: 55 U/L (ref 40–150)
ALT SERPL W P-5'-P-CCNC: 46 U/L (ref 0–50)
AST SERPL W P-5'-P-CCNC: 31 U/L (ref 0–45)
BILIRUB DIRECT SERPL-MCNC: 0.2 MG/DL (ref 0–0.2)
BILIRUB SERPL-MCNC: 0.5 MG/DL (ref 0.2–1.3)
PROT SERPL-MCNC: 7.2 G/DL (ref 6.8–8.8)
TSH SERPL DL<=0.005 MIU/L-ACNC: 1.7 MU/L (ref 0.4–4)

## 2018-07-10 NOTE — PROGRESS NOTES
Your test results all look good, Tracy.  However it will not do you any harm to take those B vitamins.

## 2018-08-29 ENCOUNTER — NURSE TRIAGE (OUTPATIENT)
Dept: NURSING | Facility: CLINIC | Age: 33
End: 2018-08-29

## 2018-08-29 NOTE — TELEPHONE ENCOUNTER
Tracy had a fall on left leg this weekend, lower leg.  Denies fever.  Tracy states there is oozing and swelling and feels warm to touch.

## 2018-08-29 NOTE — TELEPHONE ENCOUNTER
Reason for Disposition    [1] Large swelling or bruise AND [2] size > palm of person's hand    Additional Information    Negative: Serious injury with multiple fractures    Negative: [1] Major bleeding (e.g., actively dripping or spurting) AND [2] can't be stopped    Negative: Bullet wound, stabbed by knife, or other serious penetrating wound    Negative: Looks like a dislocated joint (crooked or deformed)    Negative: Can't stand (bear weight) or walk    Negative: Sounds like a life-threatening emergency to the triager    Negative: Skin is split open or gaping  (or length > 1/2 inch or 12 mm)    Negative: [1] Bleeding AND [2] won't stop after 10 minutes of direct pressure (using correct technique)    Negative: [1] Dirt in the wound AND [2] not removed with 15 minutes of scrubbing    Negative: Sounds like a serious injury to the triager    Negative: [1] SEVERE pain (e.g. excruciating)  AND [2] not improved 2 hours after pain medicine/ice packs    Negative: Suspicious history for the injury    Protocols used: LEG INJURY-ADULT-

## 2018-08-30 ENCOUNTER — OFFICE VISIT (OUTPATIENT)
Dept: FAMILY MEDICINE | Facility: CLINIC | Age: 33
End: 2018-08-30
Payer: COMMERCIAL

## 2018-08-30 VITALS
WEIGHT: 162.8 LBS | SYSTOLIC BLOOD PRESSURE: 127 MMHG | HEART RATE: 101 BPM | OXYGEN SATURATION: 97 % | BODY MASS INDEX: 24.67 KG/M2 | DIASTOLIC BLOOD PRESSURE: 84 MMHG | TEMPERATURE: 98.6 F | HEIGHT: 68 IN

## 2018-08-30 DIAGNOSIS — L03.116 CELLULITIS OF LEFT LOWER EXTREMITY: Primary | ICD-10-CM

## 2018-08-30 LAB
GRAM STN SPEC: NORMAL
GRAM STN SPEC: NORMAL
Lab: NORMAL
SPECIMEN SOURCE: NORMAL

## 2018-08-30 PROCEDURE — 87186 SC STD MICRODIL/AGAR DIL: CPT | Performed by: FAMILY MEDICINE

## 2018-08-30 PROCEDURE — 87070 CULTURE OTHR SPECIMN AEROBIC: CPT | Performed by: FAMILY MEDICINE

## 2018-08-30 PROCEDURE — 87077 CULTURE AEROBIC IDENTIFY: CPT | Performed by: FAMILY MEDICINE

## 2018-08-30 PROCEDURE — 99214 OFFICE O/P EST MOD 30 MIN: CPT | Performed by: FAMILY MEDICINE

## 2018-08-30 PROCEDURE — 87205 SMEAR GRAM STAIN: CPT | Performed by: FAMILY MEDICINE

## 2018-08-30 RX ORDER — CEPHALEXIN 500 MG/1
500 CAPSULE ORAL 3 TIMES DAILY
Qty: 30 CAPSULE | Refills: 0 | Status: ON HOLD | OUTPATIENT
Start: 2018-08-30 | End: 2022-08-30

## 2018-08-30 NOTE — PROGRESS NOTES
"  SUBJECTIVE:   Tracy Dalton is a 33 year old female who presents to clinic today for the following health issues:      fall      Duration: last saturday    Description (location/character/radiation): fell on L knee on Saturday, now some redness, bruising, swelling and cuts    Intensity:  moderate    Accompanying signs and symptoms: increase pain and swelling yesterday    History (similar episodes/previous evaluation): None    Precipitating or alleviating factors: None    Therapies tried and outcome: has been icing and elevating with limited improvement         PROBLEMS TO ADD ON...    Problem list and histories reviewed & adjusted, as indicated.  Additional history: as documented    Patient Active Problem List   Diagnosis     CARDIOVASCULAR SCREENING; LDL GOAL LESS THAN 160     Major depressive disorder, recurrent episode, moderate (H)     Anxiety     Past Surgical History:   Procedure Laterality Date     wisdom teeth         Social History   Substance Use Topics     Smoking status: Former Smoker     Start date: 1/1/2008     Smokeless tobacco: Never Used      Comment: 1 ppw     Alcohol use Yes      Comment: 2-3 drinks on weekends     Family History   Problem Relation Age of Onset     Type 1 Diabetes Other      Clotting Disorder (Unknown) Father      Coronary Artery Disease No family hx of      Hypertension No family hx of      Cerebrovascular Disease No family hx of            Reviewed and updated as needed this visit by clinical staff       Reviewed and updated as needed this visit by Provider         ROS:  Constitutional, HEENT, cardiovascular, pulmonary, gi and gu systems are negative, except as otherwise noted.    OBJECTIVE:     /84  Pulse 101  Temp 98.6  F (37  C) (Oral)  Ht 5' 8\" (1.727 m)  Wt 162 lb 12.8 oz (73.8 kg)  LMP 08/01/2018  SpO2 97%  BMI 24.75 kg/m2  Body mass index is 24.75 kg/(m^2).  GENERAL: healthy, alert and no distress  NECK: no adenopathy, no asymmetry, masses, or scars and " thyroid normal to palpation  RESP: lungs clear to auscultation - no rales, rhonchi or wheezes  CV: regular rate and rhythm, normal S1 S2, no S3 or S4, no murmur, click or rub, no peripheral edema and peripheral pulses strong  ABDOMEN: soft, nontender, no hepatosplenomegaly, no masses and bowel sounds normal  Left leg wound & knee wound with abrasion on the anterior upper 3rd of the leg and anterior knee with surronding erythema and .serosangious drainage from the wound on deep palpation. FULL ROM on knee and leg.  Diagnostic Test Results:  No results found for this or any previous visit (from the past 24 hour(s)).    ASSESSMENT/PLAN:     1. Cellulitis of left lower extremity  -left anterior knee & leg .     - Wound Culture Aerobic Bacterial; Future  - Gram stain; Future  - cephALEXin (KEFLEX) 500 MG capsule; Take 1 capsule (500 mg) by mouth 3 times daily  Dispense: 30 capsule; Refill: 0  -keep wound clean and covered   -follow up next week  For wounds check  -follow up earlier if pain or increase in surronding erythema      Potential medication side effects were discussed with the patient; let me know if any occur.  The patient indicates understanding of these issues and agrees with the plan.    Yasmine Diaz MD  [facility]

## 2018-08-30 NOTE — PATIENT INSTRUCTIONS
Cellulitis of left lower extremity  -left anterior knee & leg   - cephALEXin (KEFLEX) 500 MG capsule; Take 1 capsule (500 mg) by mouth 3 times daily  Dispense: 30 capsule; Refill: 0  -keep wound clean and covered   -follow up next week to make sure wounds healing  -follow up earlier if pain or increase in surronding erythema

## 2018-08-30 NOTE — MR AVS SNAPSHOT
After Visit Summary   8/30/2018    Tracy Dalton    MRN: [id number]           Patient Information     Date Of Birth          1985        Visit Information        Provider Department      8/30/2018 12:40 PM Yasmine Diaz MD [facility]        Today's Diagnoses     Cellulitis of left lower extremity    -  1      Care Instructions    Cellulitis of left lower extremity  -left anterior knee & leg   - cephALEXin (KEFLEX) 500 MG capsule; Take 1 capsule (500 mg) by mouth 3 times daily  Dispense: 30 capsule; Refill: 0  -keep wound clean and covered   -follow up next week to make sure wounds healing  -follow up earlier if pain or increase in surronding erythema          Follow-ups after your visit        Who to contact     If you have questions or need follow up information about today's clinic visit or your schedule please contact [facility] directly at [phone].  Normal or non-critical lab and imaging results will be communicated to you by MyChart, letter or phone within 4 business days after the clinic has received the results. If you do not hear from us within 7 days, please contact the clinic through [employer] or phone. If you have a critical or abnormal lab result, we will notify you by phone as soon as possible.  Submit refill requests through [employer] or call your pharmacy and they will forward the refill request to us. Please allow 3 business days for your refill to be completed.          Additional Information About Your Visit        MyChart Information     [employer] gives you secure access to your electronic health record. If you see a primary care provider, you can also send messages to your care team and make appointments. If you have questions, please call your primary care clinic.  If you do not have a primary care provider, please call [phone] and they will assist you.        Care EveryWhere ID     This is your Care EveryWhere ID. This could be used by  "other organizations to access your [place] medical records  [id number]        Your Vitals Were     Pulse Temperature Height Last Period Pulse Oximetry BMI (Body Mass Index)    101 98.6  F (37  C) (Oral) 5' 8\" (1.727 m) 08/01/2018 97% 24.75 kg/m2       Blood Pressure from Last 3 Encounters:   08/30/18 127/84   07/09/18 108/74   11/20/17 113/80    Weight from Last 3 Encounters:   08/30/18 162 lb 12.8 oz (73.8 kg)   07/09/18 159 lb (72.1 kg)   11/20/17 152 lb 6.4 oz (69.1 kg)              We Performed the Following     Gram stain     Wound Culture Aerobic Bacterial          Today's Medication Changes          These changes are accurate as of 8/30/18 11:59 PM.  If you have any questions, ask your nurse or doctor.               Start taking these medicines.        Dose/Directions    cephALEXin 500 MG capsule   Commonly known as:  KEFLEX   Used for:  Cellulitis of left lower extremity   Started by:  Yasmine Diaz MD        Dose:  500 mg   Take 1 capsule (500 mg) by mouth 3 times daily   Quantity:  30 capsule   Refills:  0            Where to get your medicines      These medications were sent to [place] [address] [place] [address]  [address] [place] [address]     Phone:  [phone]     cephALEXin 500 MG capsule                Primary Care Provider Office Phone # Fax #    Clara Guerrero PA-C [phone]       [address]  [facility] [address]        Equal Access to Services     NELY SULLIVAN : Hadii griselda chano Sonima, waaxda luqadaha, qaybta kaalmajacqui vazquezay idiin hayaan adeeg kharash la'aan . So [facility] [phone].    ATENCIÓN: Si habla español, tiene a iraheta disposición servicios gratuitos de asistencia lingüística. Llame al [phone].    We comply with applicable federal civil rights laws and [place] laws. We do not discriminate on the basis of race, color, national origin, age, disability, sex, sexual orientation, or " gender identity.            Thank you!     Thank you for choosing [facility]  for your care. Our goal is always to provide you with excellent care. Hearing back from our patients is one way we can continue to improve our services. Please take a few minutes to complete the written survey that you may receive in the mail after your visit with us. Thank you!             Your Updated Medication List - Protect others around you: Learn how to safely use, store and throw away your medicines at www.disposemymeds.org.          This list is accurate as of 8/30/18 11:59 PM.  Always use your most recent med list.                   Brand Name Dispense Instructions for use Diagnosis    cephALEXin 500 MG capsule    KEFLEX    30 capsule    Take 1 capsule (500 mg) by mouth 3 times daily    Cellulitis of left lower extremity       ranitidine 150 MG tablet    ZANTAC    60 tablet    Take 1 tablet (150 mg) by mouth 2 times daily    Epigastric discomfort

## 2018-08-30 NOTE — NURSING NOTE
"Chief Complaint   Patient presents with     Fall     /84  Pulse 101  Temp 98.6  F (37  C) (Oral)  Ht 5' 8\" (1.727 m)  Wt 162 lb 12.8 oz (73.8 kg)  LMP 08/01/2018  SpO2 97%  BMI 24.75 kg/m2 Estimated body mass index is 24.75 kg/(m^2) as calculated from the following:    Height as of this encounter: 5' 8\" (1.727 m).    Weight as of this encounter: 162 lb 12.8 oz (73.8 kg).        Health Maintenance due pending provider review:  Pap Smear    04/2018 Pap completed at [facility], results normal    Joan Urbina CMA  "

## 2018-09-01 NOTE — PROGRESS NOTES
Hope your wound is looking good  And keep taking the antibiotic- culture is still in progress      thanks

## 2018-09-03 LAB
BACTERIA SPEC CULT: ABNORMAL
Lab: ABNORMAL
SPECIMEN SOURCE: ABNORMAL

## 2019-05-26 ENCOUNTER — HOSPITAL ENCOUNTER (INPATIENT)
Facility: CLINIC | Age: 34
LOS: 4 days | Discharge: HOME HEALTH | End: 2019-05-30
Attending: REGISTERED NURSE | Admitting: OBSTETRICS & GYNECOLOGY
Payer: COMMERCIAL

## 2019-05-26 DIAGNOSIS — D62 ANEMIA DUE TO BLOOD LOSS, ACUTE: ICD-10-CM

## 2019-05-26 DIAGNOSIS — Z98.891 S/P PRIMARY LOW TRANSVERSE C-SECTION: Primary | ICD-10-CM

## 2019-05-26 LAB
ABO + RH BLD: NORMAL
ABO + RH BLD: NORMAL
BLD GP AB SCN SERPL QL: NORMAL
BLOOD BANK CMNT PATIENT-IMP: NORMAL
SPECIMEN EXP DATE BLD: NORMAL

## 2019-05-26 PROCEDURE — 25000132 ZZH RX MED GY IP 250 OP 250 PS 637: Performed by: REGISTERED NURSE

## 2019-05-26 PROCEDURE — 86901 BLOOD TYPING SEROLOGIC RH(D): CPT | Performed by: REGISTERED NURSE

## 2019-05-26 PROCEDURE — 12000000 ZZH R&B MED SURG/OB

## 2019-05-26 PROCEDURE — 3E0P7VZ INTRODUCTION OF HORMONE INTO FEMALE REPRODUCTIVE, VIA NATURAL OR ARTIFICIAL OPENING: ICD-10-PCS | Performed by: REGISTERED NURSE

## 2019-05-26 PROCEDURE — 86850 RBC ANTIBODY SCREEN: CPT | Performed by: REGISTERED NURSE

## 2019-05-26 PROCEDURE — 36415 COLL VENOUS BLD VENIPUNCTURE: CPT | Performed by: REGISTERED NURSE

## 2019-05-26 PROCEDURE — 86780 TREPONEMA PALLIDUM: CPT | Performed by: REGISTERED NURSE

## 2019-05-26 PROCEDURE — 86900 BLOOD TYPING SEROLOGIC ABO: CPT | Performed by: REGISTERED NURSE

## 2019-05-26 RX ORDER — ONDANSETRON 2 MG/ML
4 INJECTION INTRAMUSCULAR; INTRAVENOUS EVERY 6 HOURS PRN
Status: DISCONTINUED | OUTPATIENT
Start: 2019-05-26 | End: 2019-05-30 | Stop reason: HOSPADM

## 2019-05-26 RX ORDER — OXYTOCIN/0.9 % SODIUM CHLORIDE 30/500 ML
100-340 PLASTIC BAG, INJECTION (ML) INTRAVENOUS CONTINUOUS PRN
Status: DISCONTINUED | OUTPATIENT
Start: 2019-05-26 | End: 2019-05-30 | Stop reason: HOSPADM

## 2019-05-26 RX ORDER — OXYCODONE AND ACETAMINOPHEN 5; 325 MG/1; MG/1
1 TABLET ORAL
Status: DISCONTINUED | OUTPATIENT
Start: 2019-05-26 | End: 2019-05-30 | Stop reason: HOSPADM

## 2019-05-26 RX ORDER — PENICILLIN G POTASSIUM 5000000 [IU]/1
5 INJECTION, POWDER, FOR SOLUTION INTRAMUSCULAR; INTRAVENOUS ONCE
Status: DISCONTINUED | OUTPATIENT
Start: 2019-05-26 | End: 2019-05-26

## 2019-05-26 RX ORDER — CEFAZOLIN SODIUM 1 G/3ML
1 INJECTION, POWDER, FOR SOLUTION INTRAMUSCULAR; INTRAVENOUS EVERY 8 HOURS
Status: DISCONTINUED | OUTPATIENT
Start: 2019-05-27 | End: 2019-05-29 | Stop reason: CLARIF

## 2019-05-26 RX ORDER — PRENATAL VIT/IRON FUM/FOLIC AC 27MG-0.8MG
1 TABLET ORAL DAILY
COMMUNITY

## 2019-05-26 RX ORDER — IBUPROFEN 400 MG/1
800 TABLET, FILM COATED ORAL
Status: DISCONTINUED | OUTPATIENT
Start: 2019-05-26 | End: 2019-05-30 | Stop reason: HOSPADM

## 2019-05-26 RX ORDER — ACETAMINOPHEN 325 MG/1
650 TABLET ORAL EVERY 4 HOURS PRN
Status: DISCONTINUED | OUTPATIENT
Start: 2019-05-26 | End: 2019-05-30 | Stop reason: HOSPADM

## 2019-05-26 RX ORDER — SODIUM CHLORIDE, SODIUM LACTATE, POTASSIUM CHLORIDE, CALCIUM CHLORIDE 600; 310; 30; 20 MG/100ML; MG/100ML; MG/100ML; MG/100ML
INJECTION, SOLUTION INTRAVENOUS CONTINUOUS
Status: DISCONTINUED | OUTPATIENT
Start: 2019-05-26 | End: 2019-05-30 | Stop reason: HOSPADM

## 2019-05-26 RX ORDER — HYDROXYZINE HYDROCHLORIDE 50 MG/1
100 TABLET, FILM COATED ORAL AT BEDTIME
Status: DISCONTINUED | OUTPATIENT
Start: 2019-05-26 | End: 2019-05-30 | Stop reason: HOSPADM

## 2019-05-26 RX ORDER — NALOXONE HYDROCHLORIDE 0.4 MG/ML
.1-.4 INJECTION, SOLUTION INTRAMUSCULAR; INTRAVENOUS; SUBCUTANEOUS
Status: DISCONTINUED | OUTPATIENT
Start: 2019-05-26 | End: 2019-05-30 | Stop reason: HOSPADM

## 2019-05-26 RX ORDER — CARBOPROST TROMETHAMINE 250 UG/ML
250 INJECTION, SOLUTION INTRAMUSCULAR
Status: DISCONTINUED | OUTPATIENT
Start: 2019-05-26 | End: 2019-05-28

## 2019-05-26 RX ORDER — METHYLERGONOVINE MALEATE 0.2 MG/ML
200 INJECTION INTRAVENOUS
Status: DISCONTINUED | OUTPATIENT
Start: 2019-05-26 | End: 2019-05-30 | Stop reason: HOSPADM

## 2019-05-26 RX ORDER — CEFAZOLIN SODIUM 2 G/100ML
2 INJECTION, SOLUTION INTRAVENOUS ONCE
Status: COMPLETED | OUTPATIENT
Start: 2019-05-26 | End: 2019-05-27

## 2019-05-26 RX ORDER — TERBUTALINE SULFATE 1 MG/ML
0.25 INJECTION, SOLUTION SUBCUTANEOUS
Status: DISCONTINUED | OUTPATIENT
Start: 2019-05-26 | End: 2019-05-27

## 2019-05-26 RX ORDER — CALCIUM CARBONATE 500 MG/1
1 TABLET, CHEWABLE ORAL 2 TIMES DAILY
Status: ON HOLD | COMMUNITY
End: 2022-08-30

## 2019-05-26 RX ORDER — MISOPROSTOL 100 UG/1
25 TABLET ORAL EVERY 4 HOURS PRN
Status: DISCONTINUED | OUTPATIENT
Start: 2019-05-26 | End: 2019-05-27

## 2019-05-26 RX ORDER — OXYTOCIN 10 [USP'U]/ML
10 INJECTION, SOLUTION INTRAMUSCULAR; INTRAVENOUS
Status: DISCONTINUED | OUTPATIENT
Start: 2019-05-26 | End: 2019-05-30 | Stop reason: HOSPADM

## 2019-05-26 RX ADMIN — MISOPROSTOL 25 MCG: 100 TABLET ORAL at 21:17

## 2019-05-26 RX ADMIN — HYDROXYZINE HYDROCHLORIDE 100 MG: 50 TABLET, FILM COATED ORAL at 23:15

## 2019-05-26 ASSESSMENT — MIFFLIN-ST. JEOR: SCORE: 1624.87

## 2019-05-27 ENCOUNTER — ANESTHESIA (OUTPATIENT)
Dept: OBGYN | Facility: CLINIC | Age: 34
End: 2019-05-27
Payer: COMMERCIAL

## 2019-05-27 ENCOUNTER — ANESTHESIA EVENT (OUTPATIENT)
Dept: OBGYN | Facility: CLINIC | Age: 34
End: 2019-05-27
Payer: COMMERCIAL

## 2019-05-27 PROCEDURE — 37000011 ZZH ANESTHESIA WARD SERVICE

## 2019-05-27 PROCEDURE — 25000128 H RX IP 250 OP 636: Performed by: REGISTERED NURSE

## 2019-05-27 PROCEDURE — 3E0S3BZ INTRODUCTION OF ANESTHETIC AGENT INTO EPIDURAL SPACE, PERCUTANEOUS APPROACH: ICD-10-PCS | Performed by: ANESTHESIOLOGY

## 2019-05-27 PROCEDURE — 25000132 ZZH RX MED GY IP 250 OP 250 PS 637: Performed by: MIDWIFE

## 2019-05-27 PROCEDURE — 40000671 ZZH STATISTIC ANESTHESIA CASE

## 2019-05-27 PROCEDURE — 25000566 ZZH SEVOFLURANE, EA 15 MIN

## 2019-05-27 PROCEDURE — 27110038 ZZH RX 271: Performed by: ANESTHESIOLOGY

## 2019-05-27 PROCEDURE — 00HU33Z INSERTION OF INFUSION DEVICE INTO SPINAL CANAL, PERCUTANEOUS APPROACH: ICD-10-PCS | Performed by: ANESTHESIOLOGY

## 2019-05-27 PROCEDURE — 25000128 H RX IP 250 OP 636: Performed by: ANESTHESIOLOGY

## 2019-05-27 PROCEDURE — 25800030 ZZH RX IP 258 OP 636: Performed by: MIDWIFE

## 2019-05-27 PROCEDURE — 25000132 ZZH RX MED GY IP 250 OP 250 PS 637: Performed by: REGISTERED NURSE

## 2019-05-27 PROCEDURE — 12000000 ZZH R&B MED SURG/OB

## 2019-05-27 PROCEDURE — 3E033VJ INTRODUCTION OF OTHER HORMONE INTO PERIPHERAL VEIN, PERCUTANEOUS APPROACH: ICD-10-PCS | Performed by: MIDWIFE

## 2019-05-27 PROCEDURE — 25000128 H RX IP 250 OP 636: Performed by: MIDWIFE

## 2019-05-27 RX ORDER — ONDANSETRON 2 MG/ML
4 INJECTION INTRAMUSCULAR; INTRAVENOUS EVERY 6 HOURS PRN
Status: DISCONTINUED | OUTPATIENT
Start: 2019-05-27 | End: 2019-05-30 | Stop reason: HOSPADM

## 2019-05-27 RX ORDER — OXYTOCIN/0.9 % SODIUM CHLORIDE 30/500 ML
1-24 PLASTIC BAG, INJECTION (ML) INTRAVENOUS CONTINUOUS
Status: DISCONTINUED | OUTPATIENT
Start: 2019-05-27 | End: 2019-05-30 | Stop reason: HOSPADM

## 2019-05-27 RX ORDER — ROPIVACAINE HYDROCHLORIDE 2 MG/ML
10 INJECTION, SOLUTION EPIDURAL; INFILTRATION; PERINEURAL ONCE
Status: COMPLETED | OUTPATIENT
Start: 2019-05-27 | End: 2019-05-27

## 2019-05-27 RX ORDER — EPHEDRINE SULFATE 50 MG/ML
5 INJECTION, SOLUTION INTRAMUSCULAR; INTRAVENOUS; SUBCUTANEOUS
Status: DISCONTINUED | OUTPATIENT
Start: 2019-05-27 | End: 2019-05-30 | Stop reason: HOSPADM

## 2019-05-27 RX ORDER — LIDOCAINE HYDROCHLORIDE AND EPINEPHRINE 15; 5 MG/ML; UG/ML
3 INJECTION, SOLUTION EPIDURAL
Status: DISCONTINUED | OUTPATIENT
Start: 2019-05-27 | End: 2019-05-30 | Stop reason: HOSPADM

## 2019-05-27 RX ORDER — FENTANYL CITRATE 50 UG/ML
50-100 INJECTION, SOLUTION INTRAMUSCULAR; INTRAVENOUS
Status: DISCONTINUED | OUTPATIENT
Start: 2019-05-27 | End: 2019-05-30 | Stop reason: HOSPADM

## 2019-05-27 RX ORDER — NALBUPHINE HYDROCHLORIDE 10 MG/ML
2.5-5 INJECTION, SOLUTION INTRAMUSCULAR; INTRAVENOUS; SUBCUTANEOUS EVERY 6 HOURS PRN
Status: DISCONTINUED | OUTPATIENT
Start: 2019-05-27 | End: 2019-05-30 | Stop reason: HOSPADM

## 2019-05-27 RX ORDER — SODIUM CHLORIDE, SODIUM LACTATE, POTASSIUM CHLORIDE, CALCIUM CHLORIDE 600; 310; 30; 20 MG/100ML; MG/100ML; MG/100ML; MG/100ML
INJECTION, SOLUTION INTRAVENOUS CONTINUOUS
Status: DISCONTINUED | OUTPATIENT
Start: 2019-05-27 | End: 2019-05-30 | Stop reason: HOSPADM

## 2019-05-27 RX ORDER — CITRIC ACID/SODIUM CITRATE 334-500MG
30 SOLUTION, ORAL ORAL ONCE
Status: COMPLETED | OUTPATIENT
Start: 2019-05-27 | End: 2019-05-28

## 2019-05-27 RX ORDER — MISOPROSTOL 100 UG/1
25 TABLET ORAL
Status: DISCONTINUED | OUTPATIENT
Start: 2019-05-27 | End: 2019-05-27

## 2019-05-27 RX ORDER — CEFAZOLIN SODIUM 2 G/100ML
INJECTION, SOLUTION INTRAVENOUS
Status: DISCONTINUED
Start: 2019-05-27 | End: 2019-05-27 | Stop reason: WASHOUT

## 2019-05-27 RX ORDER — CLINDAMYCIN PHOSPHATE 900 MG/50ML
900 INJECTION, SOLUTION INTRAVENOUS EVERY 8 HOURS
Status: DISCONTINUED | OUTPATIENT
Start: 2019-05-27 | End: 2019-05-27

## 2019-05-27 RX ORDER — OXYTOCIN/0.9 % SODIUM CHLORIDE 30/500 ML
100-340 PLASTIC BAG, INJECTION (ML) INTRAVENOUS CONTINUOUS PRN
Status: COMPLETED | OUTPATIENT
Start: 2019-05-27 | End: 2019-05-28

## 2019-05-27 RX ORDER — NALOXONE HYDROCHLORIDE 0.4 MG/ML
.1-.4 INJECTION, SOLUTION INTRAMUSCULAR; INTRAVENOUS; SUBCUTANEOUS
Status: DISCONTINUED | OUTPATIENT
Start: 2019-05-27 | End: 2019-05-30 | Stop reason: HOSPADM

## 2019-05-27 RX ORDER — LIDOCAINE 40 MG/G
CREAM TOPICAL
Status: DISCONTINUED | OUTPATIENT
Start: 2019-05-27 | End: 2019-05-30 | Stop reason: HOSPADM

## 2019-05-27 RX ADMIN — CEFAZOLIN 1 G: 1 INJECTION, POWDER, FOR SOLUTION INTRAMUSCULAR; INTRAVENOUS at 22:32

## 2019-05-27 RX ADMIN — SODIUM CHLORIDE, POTASSIUM CHLORIDE, SODIUM LACTATE AND CALCIUM CHLORIDE 1000 ML: 600; 310; 30; 20 INJECTION, SOLUTION INTRAVENOUS at 21:45

## 2019-05-27 RX ADMIN — MISOPROSTOL 25 MCG: 100 TABLET ORAL at 05:30

## 2019-05-27 RX ADMIN — ROPIVACAINE HYDROCHLORIDE 10 ML: 2 INJECTION, SOLUTION EPIDURAL; INFILTRATION at 22:36

## 2019-05-27 RX ADMIN — MISOPROSTOL 25 MCG: 100 TABLET ORAL at 01:19

## 2019-05-27 RX ADMIN — OXYTOCIN 2 MILLI-UNITS/MIN: 10 INJECTION, SOLUTION INTRAMUSCULAR; INTRAVENOUS at 23:08

## 2019-05-27 RX ADMIN — Medication 12 ML/HR: at 22:40

## 2019-05-27 RX ADMIN — CEFAZOLIN SODIUM 2 G: 2 INJECTION, SOLUTION INTRAVENOUS at 13:07

## 2019-05-27 RX ADMIN — MISOPROSTOL 25 MCG: 100 TABLET ORAL at 09:40

## 2019-05-27 NOTE — PLAN OF CARE
1630: Tracy in shower for comfort  1650Meredmt in tub for comfort; hands and knees, temperature 99 degrees.  1705: Tracy out of tub, sitting on birthing ball in room, having a light snack of rice and beans.

## 2019-05-27 NOTE — PLAN OF CARE
Tracy continues to have contractions that palpate strong. Has been up on birthing ball and ambulating in hallway. Currently resting in bed alternating right/left lateral with peanut ball in place. Supportive partner at bedside. IA baseline 135 regular, audible increases, no decreases. Hydration encouraged.

## 2019-05-27 NOTE — PROVIDER NOTIFICATION
05/27/19 0645   Provider Notification   Provider Name/Title Olga Wright CNM   Method of Notification Phone   Request Evaluate - Remote   Notification Reason Uterine Activity;Labor Status;SVE;Status Update   Plan is to continue vaginal cytotec for 3 more doses.

## 2019-05-27 NOTE — PROGRESS NOTES
"OB Progress Note  2019  4:29 PM    S:  Continues to work hard with contractions, feels like they have gotten stronger.      O:  /63   Pulse 80   Temp 98.3  F (36.8  C) (Temporal)   Resp 16   Ht 1.727 m (5' 8\")   Wt 86.6 kg (191 lb)   LMP 2018   Breastfeeding? No   BMI 29.04 kg/m    EFM: baseline 135  [place]:  Ctx q2-4 min per palp  SVE:  5-6/80%/-2  Membranes: intact    A/P:  34 year old  @41w6d, labor, intermittent auscultation, GBS pos  1.  Routine care  2.  Continue prophylaxis for GBS per protocol  3.  Eval prn    Adelina Price CNM    "

## 2019-05-27 NOTE — PROGRESS NOTES
"OB Progress Note  2019  9:07 AM    S:  Feeling some cramping, but nothing very strong.      O:  /77   Pulse 81   Temp 98.4  F (36.9  C) (Temporal)   Resp 16   Ht 1.727 m (5' 8\")   Wt 86.6 kg (191 lb)   LMP 2018   Breastfeeding? No   BMI 29.04 kg/m    EFM: baseline 135, accelerations present, absent decelerations, mod variability; Category I  [place]:  Ctx q3-9 min, last 60-90 sec, mild to palp  SVE:  2.5/60%/-2, anterior, soft  Membranes: intact    A/P:  34 year old  @42w5d, IOL for post dates, Cat I,  S/P vaginal cytotec x 3  1.  Routine care  2.  Change order to oral cytotec 25mcg q 2 hours  3.  Consider Pitocin and AROM when vertex if lower and cervix more effaced  4.  Reevaluate at noon/prn    Adelina Price CNM    "

## 2019-05-27 NOTE — PLAN OF CARE
:  presents at 42 4/7 weeks gestation to labor and delivery for planned cervical ripening. External monitors applied after verbal consent by patient. Vital signs obtained, blood pressure slightly elevated, will monitor closely. Admission database obtained and prenatal record reviewed. Patient and spouse oriented to room, call light and plan of care.  : Olga Wright CNM at bedside for SVE and plan of care discussion. SVE 2/60/-4 with Zhao score of %. Vaginal cytotec every 4 hours planned.  : SVE 2/60/-4, Cytotec placed vaginally  2315: Atarax 100mg given po.  011: SVE 2/60/-4, cytotec placed vaginally Patient sleeping well between RN checks.  0530: SVE 2/60/-3 more posterior. Cytotec placed vaginally. Patient sleeping well between RN checks.  45: Olga Wright CNM updated via telephone regarding but not limited to FHT, UTCs, SVE. Plan is to continue vaginal cytotec x3 more doses. Arpita Price CNM to take over care at 0800.  0715:SBAR bedside report to Frances EDGAR RN to assume all cares for this patient.

## 2019-05-27 NOTE — PLAN OF CARE
Tracy feeling some cramping/mild contractions. Palpate mild. Ctx q 1.5-4 min. FHT moderate variability with accelerations present. Oral cytotec started at 0940. Off of monitor at 1140 to shower. Oral nourishment encouraged. Movement encouraged as labor starts to progress.

## 2019-05-27 NOTE — PLAN OF CARE
Tracy showered; contractions have increased in intensity in past 30 minutes. Now breathing through them; palpate moderate. Will hold off on oral cytotec for now. Milena CHAN CNM will be here around 1300 to reevaluate.

## 2019-05-27 NOTE — H&P
[facility] Labor and Delivery History and Physical    Tracy Dalton MRN# [id number]   Age: 34 year old YOB: 1985     Date of Admission:  2019    Primary care provider: Clara Guerrero           Chief Complaint:   Tracy Dalton is a 34 year old  who is 41w5d today and being admitted for induction of labor, indication post-dates. Pt had a BPP / on 2019 with ALY of 15. Pt has had an unremarkable prenatal course.           Pregnancy history:     OBSTETRIC HISTORY: Arcuate uterus. Normal 20 week US. Declined genetic and aneuploidy screen.     OB History    Para Term  AB Living   1 0 0 0 0 0   SAB TAB Ectopic Multiple Live Births   0 0 0 0 0      # Outcome Date GA Lbr Franc/2nd Weight Sex Delivery Anes PTL Lv   1 Current                EDC: Estimated Date of Delivery: 2019    Prenatal Labs:   Lab Results   Component Value Date    HGB 13.6 2018       GBS Status: Positive      Active Problem List  Patient Active Problem List   Diagnosis     CARDIOVASCULAR SCREENING; LDL GOAL LESS THAN 160               Labor and delivery indication for care or intervention  HSV II: Prophylaxis started at 36 week.        Medication Prior to Admission  Medications Prior to Admission   Medication Sig Dispense Refill Last Dose     cephALEXin (KEFLEX) 500 MG capsule Take 1 capsule (500 mg) by mouth 3 times daily 30 capsule 0      ranitidine (ZANTAC) 150 MG tablet Take 1 tablet (150 mg) by mouth 2 times daily (Patient not taking: Reported on 2018) 60 tablet 1 Not Taking   .        Maternal Past Medical History:     Past Medical History:   Diagnosis Date     Depressive disorder No symptoms currently. No medications currently.      and anxiety                          Social History:   I have reviewed this patient's social history          Physical Exam:   Vitals were reviewed  Last menstrual period 2018.  Constitutional:   awake, alert, cooperative,  no apparent distress, and appears stated age      Cervix:   Membranes: intact   Dilation: 2   Effacement: 60%   Station:-4   Consistency: average   Position: Mid  Presentation:Vertex  Fetal Heart Rate Tracing: Category 1  Tocometer: external monitor, occasional ctxs                       Assessment:   Tracy Dalton is a Unknown pregnant female admitted with induction of labor, indication post-dates.          Plan:   Admit - see IP orders  cervical ripening with misoprostol  Prophylactic antibiotic for + GBS status  Labor induction discussed with pt. All questions answered.     CYRIL MeeksM

## 2019-05-28 PROBLEM — Z98.890 POST-OPERATIVE STATE: Status: ACTIVE | Noted: 2019-05-28

## 2019-05-28 LAB
BASOPHILS # BLD AUTO: 0 10E9/L (ref 0–0.2)
BASOPHILS NFR BLD AUTO: 0.1 %
DIFFERENTIAL METHOD BLD: ABNORMAL
EOSINOPHIL # BLD AUTO: 0.1 10E9/L (ref 0–0.7)
EOSINOPHIL NFR BLD AUTO: 0.7 %
ERYTHROCYTE [DISTWIDTH] IN BLOOD BY AUTOMATED COUNT: 13.7 % (ref 10–15)
HCT VFR BLD AUTO: 35 % (ref 35–47)
HGB BLD-MCNC: 12.1 G/DL (ref 11.7–15.7)
IMM GRANULOCYTES # BLD: 0 10E9/L (ref 0–0.4)
IMM GRANULOCYTES NFR BLD: 0.2 %
LYMPHOCYTES # BLD AUTO: 2.2 10E9/L (ref 0.8–5.3)
LYMPHOCYTES NFR BLD AUTO: 23.9 %
MCH RBC QN AUTO: 32.8 PG (ref 26.5–33)
MCHC RBC AUTO-ENTMCNC: 34.6 G/DL (ref 31.5–36.5)
MCV RBC AUTO: 95 FL (ref 78–100)
MONOCYTES # BLD AUTO: 0.8 10E9/L (ref 0–1.3)
MONOCYTES NFR BLD AUTO: 9.1 %
NEUTROPHILS # BLD AUTO: 6 10E9/L (ref 1.6–8.3)
NEUTROPHILS NFR BLD AUTO: 66 %
NRBC # BLD AUTO: 0 10*3/UL
NRBC BLD AUTO-RTO: 0 /100
PLATELET # BLD AUTO: 158 10E9/L (ref 150–450)
RBC # BLD AUTO: 3.69 10E12/L (ref 3.8–5.2)
T PALLIDUM AB SER QL: NONREACTIVE
WBC # BLD AUTO: 9 10E9/L (ref 4–11)

## 2019-05-28 PROCEDURE — 25000128 H RX IP 250 OP 636: Performed by: REGISTERED NURSE

## 2019-05-28 PROCEDURE — 25800030 ZZH RX IP 258 OP 636: Performed by: MIDWIFE

## 2019-05-28 PROCEDURE — 25000128 H RX IP 250 OP 636: Performed by: OBSTETRICS & GYNECOLOGY

## 2019-05-28 PROCEDURE — 36415 COLL VENOUS BLD VENIPUNCTURE: CPT | Performed by: MIDWIFE

## 2019-05-28 PROCEDURE — 25000132 ZZH RX MED GY IP 250 OP 250 PS 637: Performed by: OBSTETRICS & GYNECOLOGY

## 2019-05-28 PROCEDURE — 25000128 H RX IP 250 OP 636

## 2019-05-28 PROCEDURE — 25000128 H RX IP 250 OP 636: Performed by: MIDWIFE

## 2019-05-28 PROCEDURE — 85025 COMPLETE CBC W/AUTO DIFF WBC: CPT | Performed by: MIDWIFE

## 2019-05-28 PROCEDURE — 12000035 ZZH R&B POSTPARTUM

## 2019-05-28 PROCEDURE — 25800030 ZZH RX IP 258 OP 636: Performed by: REGISTERED NURSE

## 2019-05-28 PROCEDURE — 71000014 ZZH RECOVERY PHASE 1 LEVEL 2 FIRST HR: Performed by: OBSTETRICS & GYNECOLOGY

## 2019-05-28 PROCEDURE — 25800030 ZZH RX IP 258 OP 636: Performed by: ANESTHESIOLOGY

## 2019-05-28 PROCEDURE — 36000056 ZZH SURGERY LEVEL 3 1ST 30 MIN: Performed by: OBSTETRICS & GYNECOLOGY

## 2019-05-28 PROCEDURE — 25000132 ZZH RX MED GY IP 250 OP 250 PS 637

## 2019-05-28 PROCEDURE — 27210794 ZZH OR GENERAL SUPPLY STERILE: Performed by: OBSTETRICS & GYNECOLOGY

## 2019-05-28 PROCEDURE — 36000058 ZZH SURGERY LEVEL 3 EA 15 ADDTL MIN: Performed by: OBSTETRICS & GYNECOLOGY

## 2019-05-28 PROCEDURE — 25000125 ZZHC RX 250: Performed by: REGISTERED NURSE

## 2019-05-28 PROCEDURE — 71000015 ZZH RECOVERY PHASE 1 LEVEL 2 EA ADDTL HR: Performed by: OBSTETRICS & GYNECOLOGY

## 2019-05-28 PROCEDURE — 25000125 ZZHC RX 250: Performed by: MIDWIFE

## 2019-05-28 PROCEDURE — 25800030 ZZH RX IP 258 OP 636: Performed by: OBSTETRICS & GYNECOLOGY

## 2019-05-28 RX ORDER — AMOXICILLIN 250 MG
1 CAPSULE ORAL 2 TIMES DAILY PRN
Status: DISCONTINUED | OUTPATIENT
Start: 2019-05-28 | End: 2019-05-30 | Stop reason: HOSPADM

## 2019-05-28 RX ORDER — SODIUM CHLORIDE, SODIUM LACTATE, POTASSIUM CHLORIDE, CALCIUM CHLORIDE 600; 310; 30; 20 MG/100ML; MG/100ML; MG/100ML; MG/100ML
INJECTION, SOLUTION INTRAVENOUS CONTINUOUS
Status: DISCONTINUED | OUTPATIENT
Start: 2019-05-28 | End: 2019-05-28

## 2019-05-28 RX ORDER — HYDROCORTISONE 2.5 %
CREAM (GRAM) TOPICAL 3 TIMES DAILY PRN
Status: DISCONTINUED | OUTPATIENT
Start: 2019-05-28 | End: 2019-05-30 | Stop reason: HOSPADM

## 2019-05-28 RX ORDER — OXYTOCIN/0.9 % SODIUM CHLORIDE 30/500 ML
340 PLASTIC BAG, INJECTION (ML) INTRAVENOUS CONTINUOUS PRN
Status: DISCONTINUED | OUTPATIENT
Start: 2019-05-28 | End: 2019-05-30 | Stop reason: HOSPADM

## 2019-05-28 RX ORDER — AMOXICILLIN 250 MG
2 CAPSULE ORAL 2 TIMES DAILY PRN
Status: DISCONTINUED | OUTPATIENT
Start: 2019-05-28 | End: 2019-05-30 | Stop reason: HOSPADM

## 2019-05-28 RX ORDER — LIDOCAINE 40 MG/G
CREAM TOPICAL
Status: DISCONTINUED | OUTPATIENT
Start: 2019-05-28 | End: 2019-05-30 | Stop reason: HOSPADM

## 2019-05-28 RX ORDER — ACETAMINOPHEN 325 MG/1
TABLET ORAL
Status: COMPLETED
Start: 2019-05-28 | End: 2019-05-28

## 2019-05-28 RX ORDER — OXYTOCIN/0.9 % SODIUM CHLORIDE 30/500 ML
PLASTIC BAG, INJECTION (ML) INTRAVENOUS
Status: COMPLETED
Start: 2019-05-28 | End: 2019-05-28

## 2019-05-28 RX ORDER — CEFAZOLIN SODIUM 2 G/100ML
INJECTION, SOLUTION INTRAVENOUS
Status: COMPLETED
Start: 2019-05-28 | End: 2019-05-28

## 2019-05-28 RX ORDER — AZITHROMYCIN 500 MG/1
INJECTION, POWDER, LYOPHILIZED, FOR SOLUTION INTRAVENOUS
Status: COMPLETED
Start: 2019-05-28 | End: 2019-05-28

## 2019-05-28 RX ORDER — ONDANSETRON 2 MG/ML
4 INJECTION INTRAMUSCULAR; INTRAVENOUS EVERY 6 HOURS PRN
Status: DISCONTINUED | OUTPATIENT
Start: 2019-05-28 | End: 2019-05-30 | Stop reason: HOSPADM

## 2019-05-28 RX ORDER — HYDROMORPHONE HYDROCHLORIDE 1 MG/ML
.3-.5 INJECTION, SOLUTION INTRAMUSCULAR; INTRAVENOUS; SUBCUTANEOUS EVERY 30 MIN PRN
Status: DISCONTINUED | OUTPATIENT
Start: 2019-05-28 | End: 2019-05-30 | Stop reason: HOSPADM

## 2019-05-28 RX ORDER — CEFAZOLIN SODIUM 2 G/100ML
2 INJECTION, SOLUTION INTRAVENOUS
Status: COMPLETED | OUTPATIENT
Start: 2019-05-28 | End: 2019-05-28

## 2019-05-28 RX ORDER — KETOROLAC TROMETHAMINE 30 MG/ML
30 INJECTION, SOLUTION INTRAMUSCULAR; INTRAVENOUS EVERY 6 HOURS
Status: COMPLETED | OUTPATIENT
Start: 2019-05-28 | End: 2019-05-29

## 2019-05-28 RX ORDER — ONDANSETRON 2 MG/ML
INJECTION INTRAMUSCULAR; INTRAVENOUS
Status: DISCONTINUED
Start: 2019-05-28 | End: 2019-05-28 | Stop reason: HOSPADM

## 2019-05-28 RX ORDER — CEFAZOLIN SODIUM 1 G/3ML
1 INJECTION, POWDER, FOR SOLUTION INTRAMUSCULAR; INTRAVENOUS SEE ADMIN INSTRUCTIONS
Status: DISCONTINUED | OUTPATIENT
Start: 2019-05-28 | End: 2019-05-28 | Stop reason: HOSPADM

## 2019-05-28 RX ORDER — AZITHROMYCIN 500 MG/1
500 INJECTION, POWDER, LYOPHILIZED, FOR SOLUTION INTRAVENOUS
Status: DISCONTINUED | OUTPATIENT
Start: 2019-05-28 | End: 2019-05-28 | Stop reason: HOSPADM

## 2019-05-28 RX ORDER — LIDOCAINE HCL/EPINEPHRINE/PF 2%-1:200K
VIAL (ML) INJECTION
Status: DISCONTINUED
Start: 2019-05-28 | End: 2019-05-28 | Stop reason: HOSPADM

## 2019-05-28 RX ORDER — CITRIC ACID/SODIUM CITRATE 334-500MG
30 SOLUTION, ORAL ORAL
Status: DISCONTINUED | OUTPATIENT
Start: 2019-05-28 | End: 2019-05-28

## 2019-05-28 RX ORDER — BISACODYL 10 MG
10 SUPPOSITORY, RECTAL RECTAL DAILY PRN
Status: DISCONTINUED | OUTPATIENT
Start: 2019-05-30 | End: 2019-05-30 | Stop reason: HOSPADM

## 2019-05-28 RX ORDER — MORPHINE SULFATE 1 MG/ML
INJECTION, SOLUTION EPIDURAL; INTRATHECAL; INTRAVENOUS
Status: DISCONTINUED
Start: 2019-05-28 | End: 2019-05-28 | Stop reason: HOSPADM

## 2019-05-28 RX ORDER — SIMETHICONE 80 MG
80 TABLET,CHEWABLE ORAL 4 TIMES DAILY PRN
Status: DISCONTINUED | OUTPATIENT
Start: 2019-05-28 | End: 2019-05-30 | Stop reason: HOSPADM

## 2019-05-28 RX ORDER — OXYCODONE HYDROCHLORIDE 5 MG/1
5-10 TABLET ORAL
Status: DISCONTINUED | OUTPATIENT
Start: 2019-05-28 | End: 2019-05-30 | Stop reason: HOSPADM

## 2019-05-28 RX ORDER — KETOROLAC TROMETHAMINE 30 MG/ML
INJECTION, SOLUTION INTRAMUSCULAR; INTRAVENOUS
Status: COMPLETED
Start: 2019-05-28 | End: 2019-05-28

## 2019-05-28 RX ORDER — DEXTROSE, SODIUM CHLORIDE, SODIUM LACTATE, POTASSIUM CHLORIDE, AND CALCIUM CHLORIDE 5; .6; .31; .03; .02 G/100ML; G/100ML; G/100ML; G/100ML; G/100ML
INJECTION, SOLUTION INTRAVENOUS CONTINUOUS
Status: DISCONTINUED | OUTPATIENT
Start: 2019-05-28 | End: 2019-05-30 | Stop reason: HOSPADM

## 2019-05-28 RX ORDER — OXYTOCIN 10 [USP'U]/ML
10 INJECTION, SOLUTION INTRAMUSCULAR; INTRAVENOUS
Status: DISCONTINUED | OUTPATIENT
Start: 2019-05-28 | End: 2019-05-30 | Stop reason: HOSPADM

## 2019-05-28 RX ORDER — LANOLIN 100 %
OINTMENT (GRAM) TOPICAL
Status: DISCONTINUED | OUTPATIENT
Start: 2019-05-28 | End: 2019-05-30 | Stop reason: HOSPADM

## 2019-05-28 RX ORDER — IBUPROFEN 400 MG/1
800 TABLET, FILM COATED ORAL EVERY 6 HOURS PRN
Status: DISCONTINUED | OUTPATIENT
Start: 2019-05-28 | End: 2019-05-30 | Stop reason: HOSPADM

## 2019-05-28 RX ORDER — NALOXONE HYDROCHLORIDE 0.4 MG/ML
.1-.4 INJECTION, SOLUTION INTRAMUSCULAR; INTRAVENOUS; SUBCUTANEOUS
Status: DISCONTINUED | OUTPATIENT
Start: 2019-05-28 | End: 2019-05-30 | Stop reason: HOSPADM

## 2019-05-28 RX ORDER — LIDOCAINE HCL/EPINEPHRINE/PF 2%-1:200K
VIAL (ML) INJECTION PRN
Status: DISCONTINUED | OUTPATIENT
Start: 2019-05-28 | End: 2019-05-28

## 2019-05-28 RX ORDER — ACETAMINOPHEN 325 MG/1
975 TABLET ORAL EVERY 8 HOURS
Status: DISCONTINUED | OUTPATIENT
Start: 2019-05-28 | End: 2019-05-30 | Stop reason: HOSPADM

## 2019-05-28 RX ORDER — CITRIC ACID/SODIUM CITRATE 334-500MG
SOLUTION, ORAL ORAL
Status: COMPLETED
Start: 2019-05-28 | End: 2019-05-28

## 2019-05-28 RX ORDER — OXYTOCIN/0.9 % SODIUM CHLORIDE 30/500 ML
100 PLASTIC BAG, INJECTION (ML) INTRAVENOUS CONTINUOUS
Status: DISCONTINUED | OUTPATIENT
Start: 2019-05-28 | End: 2019-05-30 | Stop reason: HOSPADM

## 2019-05-28 RX ORDER — ACETAMINOPHEN 325 MG/1
650 TABLET ORAL EVERY 4 HOURS PRN
Status: DISCONTINUED | OUTPATIENT
Start: 2019-05-31 | End: 2019-05-30 | Stop reason: HOSPADM

## 2019-05-28 RX ADMIN — LIDOCAINE HYDROCHLORIDE,EPINEPHRINE BITARTRATE 15 ML: 20; .005 INJECTION, SOLUTION EPIDURAL; INFILTRATION; INTRACAUDAL; PERINEURAL at 04:48

## 2019-05-28 RX ADMIN — KETOROLAC TROMETHAMINE 30 MG: 30 INJECTION, SOLUTION INTRAMUSCULAR at 06:48

## 2019-05-28 RX ADMIN — PHENYLEPHRINE HYDROCHLORIDE 100 MCG: 10 INJECTION INTRAVENOUS at 05:29

## 2019-05-28 RX ADMIN — PHENYLEPHRINE HYDROCHLORIDE 100 MCG: 10 INJECTION INTRAVENOUS at 05:00

## 2019-05-28 RX ADMIN — SODIUM CHLORIDE, POTASSIUM CHLORIDE, SODIUM LACTATE AND CALCIUM CHLORIDE: 600; 310; 30; 20 INJECTION, SOLUTION INTRAVENOUS at 05:04

## 2019-05-28 RX ADMIN — CEFAZOLIN SODIUM 2 G: 2 INJECTION, SOLUTION INTRAVENOUS at 04:59

## 2019-05-28 RX ADMIN — PHENYLEPHRINE HYDROCHLORIDE 100 MCG: 10 INJECTION INTRAVENOUS at 05:32

## 2019-05-28 RX ADMIN — ACETAMINOPHEN 975 MG: 325 TABLET, FILM COATED ORAL at 06:48

## 2019-05-28 RX ADMIN — ACETAMINOPHEN 975 MG: 325 TABLET, FILM COATED ORAL at 15:08

## 2019-05-28 RX ADMIN — PHENYLEPHRINE HYDROCHLORIDE 100 MCG: 10 INJECTION INTRAVENOUS at 05:23

## 2019-05-28 RX ADMIN — PHENYLEPHRINE HYDROCHLORIDE 100 MCG: 10 INJECTION INTRAVENOUS at 05:04

## 2019-05-28 RX ADMIN — KETOROLAC TROMETHAMINE 30 MG: 30 INJECTION, SOLUTION INTRAMUSCULAR at 12:47

## 2019-05-28 RX ADMIN — OXYTOCIN 100 ML/HR: 10 INJECTION, SOLUTION INTRAMUSCULAR; INTRAVENOUS at 08:06

## 2019-05-28 RX ADMIN — ACETAMINOPHEN 975 MG: 325 TABLET, FILM COATED ORAL at 22:47

## 2019-05-28 RX ADMIN — ONDANSETRON 4 MG: 2 INJECTION INTRAMUSCULAR; INTRAVENOUS at 04:51

## 2019-05-28 RX ADMIN — SODIUM CHLORIDE, SODIUM LACTATE, POTASSIUM CHLORIDE, CALCIUM CHLORIDE AND DEXTROSE MONOHYDRATE 125 ML: 5; 600; 310; 30; 20 INJECTION, SOLUTION INTRAVENOUS at 13:31

## 2019-05-28 RX ADMIN — AZITHROMYCIN MONOHYDRATE 500 MG: 500 INJECTION, POWDER, LYOPHILIZED, FOR SOLUTION INTRAVENOUS at 04:44

## 2019-05-28 RX ADMIN — SODIUM CHLORIDE, POTASSIUM CHLORIDE, SODIUM LACTATE AND CALCIUM CHLORIDE 125 ML/HR: 600; 310; 30; 20 INJECTION, SOLUTION INTRAVENOUS at 03:42

## 2019-05-28 RX ADMIN — PHENYLEPHRINE HYDROCHLORIDE 100 MCG: 10 INJECTION INTRAVENOUS at 05:40

## 2019-05-28 RX ADMIN — PHENYLEPHRINE HYDROCHLORIDE 100 MCG: 10 INJECTION INTRAVENOUS at 04:58

## 2019-05-28 RX ADMIN — PHENYLEPHRINE HYDROCHLORIDE 100 MCG: 10 INJECTION INTRAVENOUS at 05:19

## 2019-05-28 RX ADMIN — MORPHINE SULFATE 2 MG: 10 INJECTION, SOLUTION INTRAMUSCULAR; INTRAVENOUS at 05:19

## 2019-05-28 RX ADMIN — SODIUM CHLORIDE, POTASSIUM CHLORIDE, SODIUM LACTATE AND CALCIUM CHLORIDE 250 ML: 600; 310; 30; 20 INJECTION, SOLUTION INTRAVENOUS at 03:30

## 2019-05-28 RX ADMIN — Medication 30 ML: at 04:42

## 2019-05-28 RX ADMIN — OXYTOCIN-SODIUM CHLORIDE 0.9% IV SOLN 30 UNIT/500ML 340 ML/HR: 30-0.9/5 SOLUTION at 05:10

## 2019-05-28 RX ADMIN — PHENYLEPHRINE HYDROCHLORIDE 100 MCG: 10 INJECTION INTRAVENOUS at 05:14

## 2019-05-28 RX ADMIN — SODIUM CITRATE AND CITRIC ACID MONOHYDRATE 30 ML: 500; 334 SOLUTION ORAL at 04:42

## 2019-05-28 RX ADMIN — KETOROLAC TROMETHAMINE 30 MG: 30 INJECTION, SOLUTION INTRAMUSCULAR at 18:23

## 2019-05-28 RX ADMIN — PHENYLEPHRINE HYDROCHLORIDE 100 MCG: 10 INJECTION INTRAVENOUS at 05:37

## 2019-05-28 ASSESSMENT — LIFESTYLE VARIABLES: TOBACCO_USE: 1

## 2019-05-28 NOTE — PROGRESS NOTES
"OB Progress Note  2019  12:20 AM    S:  Comfortable with epidural.      O:  /81   Pulse 80   Temp 98  F (36.7  C) (Axillary)   Resp 16   Ht 1.727 m (5' 8\")   Wt 86.6 kg (191 lb)   LMP 2018   SpO2 96%   Breastfeeding? No   BMI 29.04 kg/m    EFM: baseline 145, accelerations pos, neg decelerations, mod variability; Category I  [place]:  Ctx irregular  SVE:  8/80%/-3  Membranes: intact  Pitocin at 4mu/min  Epidural    A/P:  34 year old  @42w0d, active labor, Cat I, Ptiocin, Epidural, GBS pos  1.  Routine care  2.  Continue Pitocin per protocol  3.  Continue antibiotics per protocol  4.  Peanut ball in place  5. Eval as needed    Adelina Price CNM    "

## 2019-05-28 NOTE — ANESTHESIA POSTPROCEDURE EVALUATION
Patient: Tracy Dalton    Procedure(s):   SECTION    Diagnosis:pregnancy  Diagnosis Additional Information: No value filed.    Anesthesia Type:  Epidural    Note:  Anesthesia Post Evaluation    Patient location during evaluation: Bedside  Patient participation: Able to fully participate in evaluation  Level of consciousness: awake and alert  Pain management: adequate  Airway patency: patent  Cardiovascular status: acceptable  Respiratory status: acceptable  Hydration status: acceptable  PONV: none       Comments: Epidural removed after  section with tip intact.         Last vitals:  Vitals:    19 0745 19 0817 19 0845   BP: 111/67 113/70 108/64   Pulse: 70     Resp: 21 18 16   Temp:  37.4  C (99.3  F)    SpO2: 100% 99% 99%         Electronically Signed By: Jose Mendiola MD  May 28, 2019  9:00 AM

## 2019-05-28 NOTE — PLAN OF CARE
Tracy making labor progress. Frequent position changes; ambulating, birthing ball, voiding frequently. Currently resting; sitting upright with foot of bed down. Supportive  at bedside. Laboring quietly; states is coping. Knows options are available if would like pain relief. Placed on monitor to assess contraction pattern.

## 2019-05-28 NOTE — PLAN OF CARE
CNM at the bedside at 2115 to evaluate. SVE per CNM 7/80/-2. Patient is tearful and requesting epidural. IV fluid bolus started. Late administration of antibiotic due to missing medication in pyxis. Per CNM, pitocin to be started once epidural completed. Will continue to monitor closely.

## 2019-05-28 NOTE — ANESTHESIA CARE TRANSFER NOTE
Patient: Tracy Dalton    Procedure(s):   SECTION    Diagnosis: pregnancy  Diagnosis Additional Information: No value filed.    Anesthesia Type:   Epidural     Note:  Airway :Room Air  Patient transferred to:PACU  Comments: Transferred to L&D PACU, spontaneous respirations, on room air.  All monitors and alarms on and functioning, VSS.  Patient awake, comfortable.  Report given to L&D  PACU RN.Handoff Report: Identifed the Patient, Identified the Reponsible Provider, Reviewed the pertinent medical history, Discussed the surgical course, Reviewed Intra-OP anesthesia mangement and issues during anesthesia, Set expectations for post-procedure period and Allowed opportunity for questions and acknowledgement of understanding      Vitals: (Last set prior to Anesthesia Care Transfer)    CRNA VITALS  2019 0525 - 2019 0601      2019             Resp Rate (set):  10                Electronically Signed By: CYRIL Odell CRNA  May 28, 2019  6:01 AM

## 2019-05-28 NOTE — H&P
Admitted:     2019      DATE OF PROPOSED SURGERY:  2019      PREOPERATIVE DIAGNOSIS:  Intrauterine pregnancy, 42 weeks 1-day gestation, failed induction of labor, arrest of dilation in the active phase of labor, group B strep positive.      PROPOSED PROCEDURE:  Primary low segment transverse  section.      PATIENT IDENTIFICATION:  Tracy Dlaton is a 34-year-old  woman,  1, para 0 at 42 weeks 0 days gestation who is admitted to [facility] for cervical ripening on the evening of 2019 in preparation for an induction of labor the following day, 2019 for post-dates.  The patient was followed at [facility] through the Certified Nurse Midwife program.  There were no significant complications identified during the pregnancy and the patient was monitored as she went beyond her due date with biophysical profile testing.  Once the patient reached 42 weeks gestation, induction of labor was recommended.  With the cervix being unfavorable, 1-2 cm dilated, 60% effaced, vertex -4, cervical ripening was planned in preparation prior to the induction.  The patient was admitted on the evening of 2019 to [facility].  The fetal heart rate tracing was category 1 and the patient was having only infrequent uterine contractions.  She was found to be group B strep positive at 36 weeks gestation and due to her PENICILLIN ALLERGY, Ancef was administered.  The patient initially had cervical ripening with vaginal Cytotec and this was subsequently converted to oral Cytotec after she had received 3 or 4 vaginal doses.  The patient was carefully monitored overnight and was being evaluated and assessed by Adelina Price, Certified Nurse Midwife at [facility].  The fetal heart rate tracing remained category 1.  The patient's vital signs remained normal.  By the morning of 2019, the cervix was now 2-3 cm dilated, 60%  effaced, vertex -2.  By 1300, the cervix was 4 cm, 70%, vertex -2.  The oral Cytotec was discontinued and the patient's labor and contractions continued spontaneously.  By 1630 hours the cervix was 5-6 cm dilated, 80%, vertex -2 and 6-7 cm by 1900 hours.  The patient began getting more uncomfortable and by 2134 hours an epidural was placed with excellent relief.  The patient's vital signs remained normal and the cervix was 7 cm, 80%, vertex -2 to -3.  When the patient had excellent pain relief, a low-dose Pitocin infusion was initiated.  The membranes remained intact due to the higher station of the fetal vertex.  The patient then continued to contract with the assistance of the low-dose Pitocin.  Adelina Price was carefully monitoring the patient, including the fetal status.  At 0400 hours on 2019 she was called to evaluate the patient in that the fetal heart rate tracing was showing some deep variable decelerations and some late decelerations.  The Pitocin was discontinued, and the fetal heart rate tracing returned to category 1 with no decelerations and either accelerations present or absent.  It was at this time, 0400 hours, that I was consulted to evaluate the patient and her labor course.        I arrived at the hospital approximately 25 minutes later and evaluated the patient's status.  Upon my arrival, the fetal heart rate tracing was category 1 with a normal baseline, accelerations present and no decelerations.  The cervix was 8 cm dilated, 80% effaced, and the vertex was at a -2 station.  A large bulging bag of water was present.  I had a discussion with the patient and her  regarding the course of labor and options of management at this time.  These options included proceeding with a primary  section for the diagnosis of arrest of dilation in the active phase of labor versus an attempt at amniotomy to see if this would facilitate progression to complete dilation.  The patient and her   considered these options and elected to proceed with a primary  section.  This was felt to also be appropriate in that her cervix had been 8 cm for nearly 6 hours.  The  section was then called, and preparations were being made to proceed.  Appropriate consent forms were signed.  The pros, cons, risks, benefits, and potential complications were reviewed with the patient and her .  They expressed understanding and agreement with the plan of care.  The patient had no specific medical or previous surgical complications and her PENICILLIN ALLERGY was previously known.      A brief history and physical was normal, and the patient was felt to be an excellent candidate for surgery.  Her hemoglobin on admission had been 13.2.  The patient was then brought to the operating room at [facility] on Labor and Delivery to proceed with a primary low-segment transverse  section for arrest of dilation in the active phase of labor, group B strep positive with appropriate prophylaxis.        PLAN:  Primary low-segment transverse  section with a redosing of the epidural anesthetic.         HERLINDA BENAVIDEZ MD             D: 2019   T: 2019   MT: WT      Name:     BILL BOLAÑOS   MRN:      [id number]        Account:      [id number]   :      1985        Admitted:     2019                   Document: [id number]       cc: Clara Guerrero PA-C

## 2019-05-28 NOTE — PLAN OF CARE
Data: Tracy Dalton transferred to 4403 via  bed at 0800. Baby transferred via parent's arms.  Action: Receiving unit notified of transfer: Yes. Patient and family notified of room change. Report given to Reyna OLIVERA RN at 0800. Belongings sent to receiving unit. Accompanied by Registered Nurse. Oriented patient to surroundings. Call light within reach. ID bands double-checked with receiving RN.  Response: Patient tolerated transfer and is stable.

## 2019-05-28 NOTE — PLAN OF CARE
0315- patient to back to check cervix, 8-9, 90%. Baby very high. 0320 Turned to right side, peanut ball between legs, FHR in 90's. Attempting to get FHR to trace better, 0425 FHR still 90's. Asked for Milena Price to be paged and come, Pitocin off. O2 on at 15 liters per open face mask/. by 0328 FHR back to 120 and increasing. Myrtle coming.  0340 Milena Price at bedside after updated on FHR. Talking with patient about rosenda Dr. Hinojosa and possibility of C/Section. 0350- CN talked with MD- Dr. Hinojosa.0400 FHR normal contractions 3-5 minutes no decelerations seen at present.  0410 O2 off. MD planing on doing C/Section,  fluid bolus started. 0420 Myrtle Price and Dr. Hinojosa at bedside dicussed obtion for care, SVE done per Dr. Hinojosa. Offered to break her bag of water and see how labor progressed and how baby tolerated it  Or just do a C/SEction . Patient requested a couple minutes to decide.  0430- patient wants to proceed with C/Section. Consent signed.  0435- Dr. Quintero at bedside, bolusing for surgery.Shave done. Mukul cloth to abdomen. pneumo boots on.  0440 montors off.   0447 Zithromax hung and running. Ancef given to CRNA to be given in OR> Bicitra given. Patient to OR in bed,

## 2019-05-28 NOTE — PLAN OF CARE
Late and variable deceleration continue after repositioned. Pitocin off. IV fluid bolus given. Will restart Pitocin in half hour if FHR improves.

## 2019-05-28 NOTE — OP NOTE
OB  Brief Operative Note    1.  Pre-op diagnosis-  34 year old  IUP at 42w0d                            Arrest of dilation in active labor    2.  Post-op diagnosis-  Same                                       Delivered    3.  Procedure- Primary Low Transverse  Section via Pfannenstiel skin incision with 2-layer uterine closure    4.  Surgeon- Singh Hinojosa MD    5.  Assistant- hospital first assist    6.  EBL- 890 ml    7.  Fluids-  crystalloid    8.  UOP- normal urine output, clear urine    9.  Complications- None apparent     10.  Anesthesia- Spinal with duramorph    11.  Findings-  Viable Female infant delivered on 2019 at 0509 hours from cephalic presentation.  Apgars 8 at one minute and 9 at five minutes.  Weight 9 lbs 0 oz.  Clear amniotic fluid  Placenta appeared grossly normal.  Normal appearing uterus, tubes and ovaries.    Singh Hinojosa   2019  5:55 AM

## 2019-05-28 NOTE — PROVIDER NOTIFICATION
05/28/19 0210   Provider Notification   Provider Name/Title  Myrtle Price CNM   Method of Notification Phone   Request Evaluate - Remote   Notification Reason Fetal Baseline Change;Uterine Activity     Contractions coupling every 1.5-5 minutes. Pitocin currently at 12 mu. Variable and late decelerations noted with each 'couplet' of contractions. Moderate variability. Myrtle Price CNM updated per phone.  Will reposition to other side(right) , if no change in FHR tracing will decrease Pitocin.

## 2019-05-28 NOTE — PROGRESS NOTES
"OB Progress Note  2019  4:04 AM    S:  Called into room by RN, pt experiencing deep, late decelerations. Repositioned, O2 applied and IV fluid bolus started, Pitocin turned off,  Fetal heart tracing Cat I after interventions.  Remains 8/80/high      O:  BP 94/59   Pulse 80   Temp 97.8  F (36.6  C) (Axillary)   Resp 16   Ht 1.727 m (5' 8\")   Wt 86.6 kg (191 lb)   LMP 2018   SpO2 97%   Breastfeeding? No   BMI 29.04 kg/m    EFM: baseline 140, accelerations neg, neg decelerations, mod variability; Category I  [place]:  Ctx q4 min  SVE:  8/80%/high  Membranes: intact    A/P:  34 year old  @42w0d, active labor, Cat I  1.  Dr Hinojosa called for bedside consult  2. Discussed possibility of c/s with pt and     Adelina Price CNM    "

## 2019-05-28 NOTE — ANESTHESIA PREPROCEDURE EVALUATION
"Anesthesia Pre-Procedure Evaluation    Patient: Tracy Dalton   MRN: [id number] : 1985          Preoperative Diagnosis: * No surgery found *        Past Medical History:   Diagnosis Date     Depressive disorder     and anxiety     Past Surgical History:   Procedure Laterality Date     wisdom teeth         Anesthesia Evaluation       history and physical reviewed .             ROS/MED HX    ENT/Pulmonary: Comment: Ex smoker    (+)tobacco use, Past use , . .    Neurologic:  - neg neurologic ROS     Cardiovascular:  - neg cardiovascular ROS       METS/Exercise Tolerance:     Hematologic:         Musculoskeletal:         GI/Hepatic:  - neg GI/hepatic ROS       Renal/Genitourinary:         Endo:         Psychiatric:         Infectious Disease:         Malignancy:         Other:                     neg OB ROS            Physical Exam  Normal systems: cardiovascular, pulmonary and dental    Airway   Mallampati: II  TM distance: > 3 FB  Neck ROM: full  Mouth opening: > 3 cm    Dental     Cardiovascular       Pulmonary             Lab Results   Component Value Date    WBC 7.0 2018    HGB 13.6 2018    HCT 40.0 2018     2018    SED 5 10/22/2015    ALBUMIN 4.0 2018    PROTTOTAL 7.2 2018    ALT 46 2018    AST 31 2018    ALKPHOS 55 2018    BILITOTAL 0.5 2018    TSH 1.70 2018       Preop Vitals  BP Readings from Last 3 Encounters:   19 113/83   18 127/84   18 108/74    Pulse Readings from Last 3 Encounters:   19 80   18 101   18 75      Resp Readings from Last 3 Encounters:   19 16   17 14   10/17/17 14    SpO2 Readings from Last 3 Encounters:   18 97%   18 97%   17 95%      Temp Readings from Last 1 Encounters:   19 36.9  C (98.4  F) (Temporal)    Ht Readings from Last 1 Encounters:   19 1.727 m (5' 8\")      Wt Readings from Last 1 Encounters:   19 86.6 kg (191 " "lb)    Estimated body mass index is 29.04 kg/m  as calculated from the following:    Height as of this encounter: 1.727 m (5' 8\").    Weight as of this encounter: 86.6 kg (191 lb).       Anesthesia Plan      History & Physical Review  History and physical reviewed and following examination; no interval change.    ASA Status:  2 .  OB Epidural Asa: 2   NPO Status:  > 8 hours    Plan for Epidural   PONV prophylaxis:  Ondansetron (or other 5HT-3)       Postoperative Care  Postoperative pain management:  Neuraxial analgesia.      Consents  Anesthetic plan, risks, benefits and alternatives discussed with:  Patient and Patient.  Use of blood products discussed: Yes.   Use of blood products discussed with Patient.  Consented to blood products.  .                 Jose Mendiola MD  "

## 2019-05-28 NOTE — PLAN OF CARE
Arrived on floor at 0800, oriented to room and safety protocols. VSS, fundus firm, scant flow. Toradol and Tylenol for pain, declines narcotics. Drsg. with shadow drainage. Chaney draining clear, robert urine. IV fluids infusing. Breastfeeding her baby girl who is latching well, doing skin to skin. Up to BR with assist this afternoon, viry care done. Tolerated well. Had juice and crackers. Resting between cares.

## 2019-05-28 NOTE — PLAN OF CARE
0305- no late or variable deceleration for past 20 minutes. Contractions 5-7 minutes apart. Pitocin restarted at 6 mu.

## 2019-05-28 NOTE — PLAN OF CARE
Care assumed at 1930. Patient coping with labor well. Patient currently has EFM applied.Contractions q2-4 min apart, palpating moderate. FHT baseline 140-150, moderate variability, accelerations present, no decelerations.  Milena Price CNM at the bedside at 2010 to evaluate. SVE 6-7/80/-2 per CNM. Intact membranes still. EFM removed, patient encouraged to ambulate. Will continue monitoring with doppler.

## 2019-05-28 NOTE — PROGRESS NOTES
OB note    Asked to see the patient by Adelina Price CNM regarding this primigravida for arrest of dilation and fetal status.  History and hospital course to date reviewed.  In brief, this is an induction of labor for post dates with cervical ripening and Pitocin.  The patient was admitted on the evening of 5/26/19 and has received several doses of  oral Cytotec with success, followed by Pitocin.  Amniotomy has not bee performed due to the fetal station.  The patient progressed, albeit somewhat slowly to 8 cm, when there has been an arrest of dilation.  In addition, the development of variable and late decelerations has necessitated discontinuation of the Pitocin.  The patient has had adequate labor as evidenced by the duration, frequency and palpable strength of contractions.  I am now consulted for recommendations and further measures.     I examined the patient and the cervix is 8 cm, 90% effaced and the vertex is at -2 station, well applied to the cervix.  There is a bulging bag of water.  The fetal tracing is Category I with a normal baseline heart rate, moderate variability and accelerations are present or absent.  Currently, there are no decelerations.      I offered the patient two options:  To proceed with a primary low transverse C/S at this time or perform amniotomy and determine whether this intervention would impact the course of her labor.  After consideration, the couple elect to proceed with primary C/S.  This is felt to be appropriate.     The pros, cons, risks and benefits including the potential complications were reviewed (including infection, bleeding, transfusion, injury to normal structures and the impact on future pregnancies.  The couple expressed understanding and wished to proceed.  Consent forms will be signed.  Anesthesia was notified.

## 2019-05-28 NOTE — PROGRESS NOTES
"OB Progress Note  2019  7:07 PM    S:  Coping well, breathing through contractions, has been walking, using the birthing ball and shower      O:  /76   Pulse 80   Temp 98  F (36.7  C) (Temporal)   Resp 16   Ht 1.727 m (5' 8\")   Wt 86.6 kg (191 lb)   LMP 2018   Breastfeeding? No   BMI 29.04 kg/m    EFM: baseline 150, accelerations pos, neg decelerations, mod variability; Category I  [place]:  Ctx q2-4 min, last 60sec, palp strong  SVE:  6-7/80%/-2  Membranes: intact    A/P:  34 year old  @41w6d, labor, GBS pos, Cat I  1.  Routine care  2.  Continue antibiotics per protocol  3.  Eval prn    Adelina Price CNM    "

## 2019-05-28 NOTE — PROGRESS NOTES
"OB Progress Note  2019  9:34 PM    S:  Getting very tired. Contractions still feel very strong.  Desires epidural.      O:  /76   Pulse 80   Temp 98  F (36.7  C) (Temporal)   Resp 16   Ht 1.727 m (5' 8\")   Wt 86.6 kg (191 lb)   LMP 2018   Breastfeeding? No   BMI 29.04 kg/m    EFM: baseline 140, accelerations pos, neg decelerations, mod variability; Category I  [place]:  Ctx q2-5 min  SVE:  7/80%/-2  Membranes: intact    A/P:  34 year old  @41w6d, Cat I, GBS pos  1.  Routine care  2.  Epidural asap  3.  Start low dose pitocin after epidural  4. Reevaluate prn    Adelina Price CNM    "

## 2019-05-28 NOTE — OR NURSING
0600 patient arrived in recovery room alert and aware. Monitors applied. Fundus firm. minimalla bleeding. patient denies pain,  Baby brought to patient chest. pneumo boots on.  0645 Tylenol and Toradol given. Patient very uncomfortable with fundal checks but refuses anything else for pain.  Dressing-- small amount of drainage along the lower edge. Baby breast feed well.

## 2019-05-28 NOTE — LACTATION NOTE
Initial visit with WILLOW Molina and baby.  Baby latched on well to the left breast.     Breastfeeding general information reviewed.   Advised to breastfeed exclusively, on demand, avoid pacifiers, bottles and formula unless medically indicated.  Encouraged rooming in, skin to skin, feeding on demand 8-12x/day or sooner if baby cues.  Explained benefits of holding and skin to skin.  Encouraged lots of skin to skin. Instructed on hand expression.   Continues to nurse well per mom. No further questions at this time. Follow up with [facility].  Has a breast pump for home.    Will follow as needed.   Marlene Goldberg BSN, RN, PHN, RNC-MNN, IBCLC

## 2019-05-29 LAB — HGB BLD-MCNC: 10 G/DL (ref 11.7–15.7)

## 2019-05-29 PROCEDURE — 36415 COLL VENOUS BLD VENIPUNCTURE: CPT | Performed by: OBSTETRICS & GYNECOLOGY

## 2019-05-29 PROCEDURE — 85018 HEMOGLOBIN: CPT | Performed by: OBSTETRICS & GYNECOLOGY

## 2019-05-29 PROCEDURE — 12000035 ZZH R&B POSTPARTUM

## 2019-05-29 PROCEDURE — 25000132 ZZH RX MED GY IP 250 OP 250 PS 637: Performed by: OBSTETRICS & GYNECOLOGY

## 2019-05-29 PROCEDURE — 25000128 H RX IP 250 OP 636: Performed by: OBSTETRICS & GYNECOLOGY

## 2019-05-29 RX ADMIN — ACETAMINOPHEN 975 MG: 325 TABLET, FILM COATED ORAL at 17:06

## 2019-05-29 RX ADMIN — IBUPROFEN 800 MG: 400 TABLET ORAL at 08:53

## 2019-05-29 RX ADMIN — SENNOSIDES AND DOCUSATE SODIUM 1 TABLET: 8.6; 5 TABLET ORAL at 20:13

## 2019-05-29 RX ADMIN — KETOROLAC TROMETHAMINE 30 MG: 30 INJECTION, SOLUTION INTRAMUSCULAR at 00:32

## 2019-05-29 RX ADMIN — IBUPROFEN 800 MG: 400 TABLET ORAL at 17:07

## 2019-05-29 RX ADMIN — SENNOSIDES AND DOCUSATE SODIUM 2 TABLET: 8.6; 5 TABLET ORAL at 08:53

## 2019-05-29 RX ADMIN — OXYCODONE HYDROCHLORIDE 5 MG: 5 TABLET ORAL at 17:07

## 2019-05-29 RX ADMIN — OXYCODONE HYDROCHLORIDE 5 MG: 5 TABLET ORAL at 08:53

## 2019-05-29 RX ADMIN — IBUPROFEN 800 MG: 400 TABLET ORAL at 22:55

## 2019-05-29 RX ADMIN — OXYCODONE HYDROCHLORIDE 5 MG: 5 TABLET ORAL at 22:55

## 2019-05-29 RX ADMIN — OXYCODONE HYDROCHLORIDE 5 MG: 5 TABLET ORAL at 13:26

## 2019-05-29 RX ADMIN — ACETAMINOPHEN 975 MG: 325 TABLET, FILM COATED ORAL at 06:46

## 2019-05-29 NOTE — PLAN OF CARE
VSS, fundus firm with no free flow or clots.  Pt is up ambulating, voiding and planning to shower this afternoon.  Enc to ambulate and increase fluids.  Enc to call with needs, questions and concerns.

## 2019-05-29 NOTE — OP NOTE
Procedure Date: 2019      PREOPERATIVE DIAGNOSES:  Intrauterine pregnancy 42 weeks 1 day gestation, failed induction of labor, arrest of dilation in the active phase of labor, group B strep positive with appropriate prophylaxis.      POSTOPERATIVE DIAGNOSES:  Intrauterine pregnancy 42 weeks 1 day gestation, failed induction of labor, arrest of dilation in the active phase of labor, group B strep positive with appropriate prophylaxis with fetal cord around the waist.      PROCEDURE PERFORMED: Primary low segment transverse  section.      SURGEON:  Singh Hinojosa MD      ANESTHESIA:  Epidural.      BLOOD LOSS:  890 mL .      PATIENT IDENTIFICATION:  Tracy Dalton is a 34-year-old  woman,  1, para 0 at 42 weeks 1 day gestation who is to undergo a primary low segment transverse  section for arrest of dilation in the active phase of labor.  The patient was followed at our office, [facility], by the certified nurse midwife program.  There were no significant complications of the pregnancy, although the patient was found to be group B strep positive.  She progressed to her due date and beyond.  Fetal wellbeing was confirmed with antepartum testing with the patient post-dates.  However, at 42 weeks gestation, induction of labor was recommended.  The cervix was unfavorable being 1-2 cm dilated, 60% effaced and the vertex was high.  The patient was therefore admitted on the evening of 2019 for cervical ripening prior to scheduled induction the following day.  Her vital signs were normal on admission and the baby was category 1.  Vaginal Cytotec was initially used to ripen the cervix and this was converted to oral Cytotec on the morning of 2019.  Ruptured membranes was now performed due to the high station of the vertex.  The patient's contractions continued after the oral Cytotec had been discontinued.  The cervix gradually progressed throughout the afternoon and evening  of 2019 and late in the evening of 2019, the cervix had progressed to 7-8 cm, 80% effaced, vertex -3.  An epidural was placed with satisfactory relief.  However, despite low-dose Pitocin augmentation, the cervix did not progress beyond 8 cm.  The patient remained 8 cm for several hours until I was consulted at approximately 0400 hours on 2019.  I arrived to evaluate the patient and confirmed that her examination was 8 cm dilated, 80%, vertex -2 to -3.  There was a large bulging bag of water.  Given the fact that the patient had had a significant arrest disorder in labor, I offered the couple either amniotomy with continued Pitocin augmentation versus proceeding to  section.  The couple considered these options and elected to proceed directly to  section.  This was felt to be appropriate given the clinical course and labor course.  Appropriate consent forms were signed.  The pros, cons, risks, benefits and potential complications were reviewed and the couple wished to proceed.  The patient's preoperative hemoglobin was 13.2.  The patient had received satisfactory group B strep prophylaxis with Ancef in labor.      OPERATIVE FINDINGS:  At the time of  section, the patient was delivered of an infant female, 9 pounds 0 ounces with Apgars of 8 and 9 at one and five minutes respectively.  No resuscitation was necessary.  The baby was in excellent condition following delivery, as evidenced by the Apgar scores of 8 and 9.  There was an umbilical cord wrapped around the baby's waist and under the leg.  This was likely responsible for the earlier decelerations in labor.  The uterus appeared normal and the uterine cavity was also normal.  There had been some question of an arcuate nature to the uterine cavity.  This was not appreciated at the time of surgery.  The ovaries and tubes were completely normal.  The uterus was closed in a double layer technique with the second layer  imbricating.  There were no intraoperative complications.  The blood loss was within normal limits and the patient had no anesthetic issues.      OPERATIVE PROCEDURE:  Tracy Dalton was brought to the operating room on Labor and Delivery at [facility].  The epidural anesthetic was redosed for  section and she was placed in the left lateral tilt position.  The Chaney catheter had been in place for labor and was draining clear urine.  The patient was then prepped and draped in the usual fashion for  section.  Once the patient was prepped and draped, all instruments were readied.  At this time, a timeout was held, and the patient's name, medical record number and date of birth were reviewed.  All members of the operating room staff were in agreement with the patient's identity and the surgery to be performed, namely, a primary  section for arrest of dilation in active phase labor.  Once the timeout was completed, the patient was tested for adequacy of the epidural block.  This was confirmed.  A low transverse skin incision was made and carried down through skin, fat and fascia.  The underlying fascia was incised bilaterally and the fascia was dissected away from the rectus muscles.  The rectus muscles were  in the midline and the underlying peritoneum was elevated and incised.  Care was taken to avoid injury to the dome of the bladder.  The bladder blade was placed.  The lower uterine segment was identified and the bladder flap was created.  Hysterotomy was then performed and carried down through all layers of myometrium.  The amniotic fluid was entered and the fluid was clear.  The uterine incision was extended using bandage scissors and with finger fracture.  A hand was then placed distal to the presenting vertex and the baby's head was brought out through the uterine incision.  The nose and mouth were suctioned.  The fluid was clear.  The baby was delivered  atraumatically and the cord around the waist and around the leg was identified.  Delayed cord clamping was performed.  The cord was subsequently clamped and ligated and the baby was brought to the infant warmer.  The product of the pregnancy, an infant female, 9 pounds 0 ounces, Apgars 8 and 9 at one and five minutes respectively.  No significant resuscitation was necessary.  There was no birth trauma, no congenital anomalies.  The placenta was removed manually and was discarded.  A segment of cord had been collected in the event blood gases were necessary but based on the fetal status and Apgar scores, the cord segment was discarded.  Intravenous oxytocin was infused to provide excellent tone.  The uterus was cleansed of all clot and membrane.  The uterine cavity was palpably normal without evidence of any bicornuate or arcuate nature.  The uterus was closed in two layers, the first was a running locked stitch of #0 Vicryl, the second an imbricating stitch of #0 Monocryl.  Uterine hemostasis was achieved.  The bladder flap was then closed using a running unlocked stitch of 3-0 Vicryl.  Copious irrigation of the abdomen and pelvis was then performed.  All blood and clots were removed.  The tubes and ovaries were confirmed to be normal.  Once all sponge and needle counts were correct, the anterior peritoneum was closed using a running unlocked stitch of 3-0 Vicryl.  Hemostasis was confirmed in the subfascial tissues.  The fascia was closed using a running unlocked stitch of #0 Vicryl x 2, meeting in the midline.  Copious irrigation of the subcutaneous was performed.  The subcutaneous was reapproximated using 3-0 Vicryl interrupted sutures.  The skin was closed using subcuticular suture 4-0 Monocryl.  Steri-Strips and a dressing was applied.  The patient was brought in and excellent condition, having tolerated the procedure well.  A debriefing was held at which time the patient's identity and the surgery that was  performed was reviewed.  There were no surgical specimens, the blood loss was 890 mL.  The patient required no blood transfusion.  The uterine tone was excellent and there was minimal if any vaginal bleeding.  The procedure was therefore without complication and the patient was brought to the recovery room in excellent condition, having tolerated the procedure well.  The baby was also in the recovery room with skin to skin bonding.         HERLINDA BENAVIDEZ MD             D: 2019   T: 2019   MT: JONAH      Name:     BILL BOLAÑOS   MRN:      [id number]        Account:        [id number]   :      1985           Procedure Date: 2019      Document: [id number]

## 2019-05-29 NOTE — PROGRESS NOTES
"OB Post-op  Section Progress Note POD# 2    S:  Patient doing well.  Pain is well controlled with oral pain medication.  Ambulating.  Tolerating regular diet diet.  No N/V.  Passing flatus.  Voiding.  Bleeding is normal.  Breastfeeding.    O:  /77   Pulse 75   Temp 98.5  F (36.9  C) (Oral)   Resp 16   Ht 1.727 m (5' 8\")   Wt 86.6 kg (191 lb)   LMP 2018   SpO2 100%   Breastfeeding? Unknown   BMI 29.04 kg/m    UOP- voiding normal volumes  Gen- A&O, NAD  Lungs- CTAB  CV-RRR  Abd- soft, non-tender, +BS, no rebound or guarding, fundus firm at umbilicus  Incision- C/D/I.  Dressing stained, but dry  Ext- non-tender, no edema. No leg or calf pain.    Hemoglobin   Date Value Ref Range Status   2019 12.1 11.7 - 15.7 g/dL Final     A positive  Rubella immune    A/P:  34 year old  POD# 2 s/p primary LTCS for arrest of dilation in active labor.  Normal post operative and post partum course.  No apparent complications.     1.  Routine post-op cares  2.  Analgesia  3.  Ambulate  4.  Discharge is planned for tomorrow  5.  The plan of care was discussed with the patient.  She expressed understanding and agreement.       Singh Hinojosa  2019  7:51 AM   "

## 2019-05-29 NOTE — PLAN OF CARE
Patient meeting expected goals for this shift.  Using toradol and tylenol for pain/comfort.  Up and ambulating around the room and to the bathroom with stand by assist.  Working on breastfeeding  and  cares.   present at bedside and supportive.  Positive bonding behaviors observed.  Continue to monitor and notify MD as needed.

## 2019-05-30 VITALS
WEIGHT: 191 LBS | HEART RATE: 91 BPM | BODY MASS INDEX: 28.95 KG/M2 | TEMPERATURE: 98.3 F | OXYGEN SATURATION: 100 % | SYSTOLIC BLOOD PRESSURE: 112 MMHG | HEIGHT: 68 IN | RESPIRATION RATE: 16 BRPM | DIASTOLIC BLOOD PRESSURE: 83 MMHG

## 2019-05-30 PROBLEM — Z98.891 S/P PRIMARY LOW TRANSVERSE C-SECTION: Status: ACTIVE | Noted: 2019-05-30

## 2019-05-30 PROBLEM — D62 ANEMIA DUE TO BLOOD LOSS, ACUTE: Status: ACTIVE | Noted: 2019-05-30

## 2019-05-30 PROCEDURE — 25000132 ZZH RX MED GY IP 250 OP 250 PS 637: Performed by: OBSTETRICS & GYNECOLOGY

## 2019-05-30 RX ORDER — OXYCODONE HYDROCHLORIDE 5 MG/1
5 TABLET ORAL EVERY 4 HOURS PRN
Qty: 20 TABLET | Refills: 0 | Status: ON HOLD | OUTPATIENT
Start: 2019-05-30 | End: 2022-08-30

## 2019-05-30 RX ORDER — AMOXICILLIN 250 MG
2 CAPSULE ORAL 2 TIMES DAILY PRN
Status: ON HOLD | COMMUNITY
Start: 2019-05-30 | End: 2022-08-30

## 2019-05-30 RX ADMIN — IBUPROFEN 800 MG: 400 TABLET ORAL at 05:08

## 2019-05-30 RX ADMIN — OXYCODONE HYDROCHLORIDE 5 MG: 5 TABLET ORAL at 05:08

## 2019-05-30 RX ADMIN — OXYCODONE HYDROCHLORIDE 5 MG: 5 TABLET ORAL at 11:30

## 2019-05-30 RX ADMIN — IBUPROFEN 800 MG: 400 TABLET ORAL at 11:30

## 2019-05-30 RX ADMIN — ACETAMINOPHEN 975 MG: 325 TABLET, FILM COATED ORAL at 01:07

## 2019-05-30 RX ADMIN — OXYCODONE HYDROCHLORIDE 5 MG: 5 TABLET ORAL at 01:56

## 2019-05-30 RX ADMIN — ACETAMINOPHEN 975 MG: 325 TABLET, FILM COATED ORAL at 08:20

## 2019-05-30 RX ADMIN — OXYCODONE HYDROCHLORIDE 5 MG: 5 TABLET ORAL at 08:20

## 2019-05-30 RX ADMIN — SENNOSIDES AND DOCUSATE SODIUM 2 TABLET: 8.6; 5 TABLET ORAL at 08:20

## 2019-05-30 NOTE — LACTATION NOTE
Routine visit with WILLOW Molina and baby  Baby girl latched on well to the right breast  Baby deeply latched with lips flanged and swallows heard  Discussed Spectra pump and Questions answered regarding pumping and physiology of milk supply and production.  Getting ready for discharge.  Plan: Watch for feeding cues and feed every 2-3 hours and/or on demand. Continue to use feeding log to track intake and appropriate voids and stools. Take feeding log to first follow up appointment or weight check. Encourage skin to skin to promote frequent feedings, thermoregulation and bonding. Follow-up with healthcare provider or lactation consultant for questions or concerns.    No further questions at this time. Will follow as needed  Marlene Goldberg BSN, RN, PHN, RNC-MNN, IBCLC

## 2019-05-30 NOTE — PLAN OF CARE
Voiding adequately. Pain controlled with ibuprofen and tylenol, declined oxycodone, will call when needs. Breastfeeding well independently. Up for walk in the [place].

## 2019-05-30 NOTE — PROGRESS NOTES
"OB Post-op  Section Progress Note POD# 3    S:  Patient doing well.  Pain well controlled with oral pain medication.  Ambulating.  Tolerating regular diet.  No N/V.  Passing flatus.  Voiding.  Bleeding normal.  Breastfeeding.    O:  /83 (BP Location: Left arm)   Pulse 91   Temp 98.3  F (36.8  C) (Oral)   Resp 16   Ht 1.727 m (5' 8\")   Wt 86.6 kg (191 lb)   LMP 2018   SpO2 100%   Breastfeeding? Unknown   BMI 29.04 kg/m    Gen- A&O, NAD  Abd- soft, non-tender, +BS, no rebound or guarding, fundus firm at umbilicus  Incision- C/D/I (steri strips)  Ext- non-tender, no edema. Pneumoboots in place lower extremities    Hemoglobin   Date Value Ref Range Status   2019 10.0 (L) 11.7 - 15.7 g/dL Final     A +  Rubella immune    A/P:  34 year old  POD# 3 s/p PLTCS for arrest of dilation.    1.  Routine post-op cares  2.  Mild acute blood loss anemia - asymptomatic, home on oral iron.  3.  Anticipate d/c home on POD#3    Vesta Barfield MD  2019  9:13 AM   "

## 2019-05-30 NOTE — PLAN OF CARE
Vital signs stable. Fundus firm, U/U, bleeding scant, no clots. Incision well approximated, steri strips intact, open to air. Up to bathroom independently, encouraged frequent voiding. Taking Tylenol, ibuprofen, oxycodone for pain. Independent with breastfeeding and  cares.  at bedside very supportive. Will continue to monitor and notify MD as needed.

## 2019-05-30 NOTE — PLAN OF CARE
D: VSS, assessments WDL.   I: Pt. received complete discharge paperwork and home medications as filled by discharge pharmacy-oxycodone. All other medications over the counter.  Pt. was given times of last dose for all discharge medications in writing on discharge medication sheets.  Discharge teaching included home medication, pain management, activity restrictions, postpartum cares, and signs and symptoms of infection.    A: Discharge outcomes on care plan met.  Mother states understanding and comfort with self and baby cares.  P: Pt. discharged to home.  Pt. was discharged with baby, and bands were checked at time of discharge.  Pt. was accompanied by , nurse and baby, and left with personal belongings.  Home care sent.  Pt. to follow up with OB per MD order.  Pt. had no further questions at the time of discharge and no unmet needs were identified.

## 2020-01-20 ENCOUNTER — OFFICE VISIT (OUTPATIENT)
Dept: FAMILY MEDICINE | Facility: CLINIC | Age: 35
End: 2020-01-20
Payer: COMMERCIAL

## 2020-01-20 ENCOUNTER — ANCILLARY PROCEDURE (OUTPATIENT)
Dept: GENERAL RADIOLOGY | Facility: CLINIC | Age: 35
End: 2020-01-20
Attending: FAMILY MEDICINE
Payer: COMMERCIAL

## 2020-01-20 VITALS
HEIGHT: 68 IN | TEMPERATURE: 98.4 F | RESPIRATION RATE: 16 BRPM | HEART RATE: 82 BPM | SYSTOLIC BLOOD PRESSURE: 118 MMHG | DIASTOLIC BLOOD PRESSURE: 84 MMHG | OXYGEN SATURATION: 98 % | BODY MASS INDEX: 25.61 KG/M2 | WEIGHT: 169 LBS

## 2020-01-20 DIAGNOSIS — R07.81 PLEURITIC CHEST PAIN: ICD-10-CM

## 2020-01-20 DIAGNOSIS — R07.81 PLEURITIC CHEST PAIN: Primary | ICD-10-CM

## 2020-01-20 PROCEDURE — 71046 X-RAY EXAM CHEST 2 VIEWS: CPT | Mod: FY

## 2020-01-20 PROCEDURE — 99213 OFFICE O/P EST LOW 20 MIN: CPT | Performed by: FAMILY MEDICINE

## 2020-01-20 ASSESSMENT — MIFFLIN-ST. JEOR: SCORE: 1525.08

## 2020-01-20 NOTE — PROGRESS NOTES
"  Tracy Dalton is a 34 year old female who presents to clinic today for the following health issues:    HPI   Joint Pain    Onset: before Christmas 2019    Description:   Location: right side rib pain   Character: Sharp and Dull ache    Intensity: moderate    Progression of Symptoms: same, intermittent    Accompanying Signs & Symptoms:  Other symptoms: cough pt states it started with cough     History:   Previous similar pain: no       Precipitating factors:   Trauma or overuse: YES- pt thinks it's related to cough she started having around the same time     Alleviating factors:  Improved by: nothing    Therapies Tried and outcome: ice,heat, rest and stretching       Denies These symptoms(Neg ROS): fever, Nausea/Vomiting/Diarrhea, rash, wheezing.  Review of symptoms except as noted in the HPI is otherwise negative.    MEDICATIONS  Current Outpatient Medications   Medication Sig Dispense Refill     calcium carbonate (TUMS) 500 MG chewable tablet Take 1 chew tab by mouth 2 times daily       cephALEXin (KEFLEX) 500 MG capsule Take 1 capsule (500 mg) by mouth 3 times daily 30 capsule 0     oxyCODONE (ROXICODONE) 5 MG tablet Take 1 tablet (5 mg) by mouth every 4 hours as needed for moderate to severe pain 20 tablet 0     Prenatal Vit-Fe Fumarate-FA (PRENATAL MULTIVITAMIN W/IRON) 27-0.8 MG tablet Take 1 tablet by mouth daily       ranitidine (ZANTAC) 150 MG tablet Take 1 tablet (150 mg) by mouth 2 times daily 60 tablet 1     senna-docusate (SENOKOT-S/PERICOLACE) 8.6-50 MG tablet Take 2 tablets by mouth 2 times daily as needed for constipation       Allergies:    Allergies   Allergen Reactions     Penicillins        SOCIAL   reports that she quit smoking about 12 years ago. She started smoking about 12 years ago. She has never used smokeless tobacco.  OBJECTIVE:  /84   Pulse 82   Temp 98.4  F (36.9  C) (Oral)   Resp 16   Ht 1.727 m (5' 8\")   Wt 76.7 kg (169 lb)   SpO2 98%   BMI 25.70 kg/m    General " appearance: healthy, alert and cooperative.  Left Ear: normal; external ear canal and TM clear, nontender.  Right Ear: normal; external ear canal and TM clear, nontender.  Nose: clear rhinorrhea  Sinuses: normal; sinuses nontender  Oropharynx: mild erythema  Neck: few small nontender anterior cervical nodes  Lungs: normal chest wall and respirations; clear to auscultation.    XRAY:  Chest 2 views:  Personally reviewed by me - no airspace, soft tissue, cardiac or bony abnormalities.    ASSESSMENT/PLAN:    ICD-10-CM    1. Pleuritic chest pain R07.81 XR Chest 2 Views     Atypical chest pain Differential Dx includes chest wall pain,   pleurisy, referred GI pain such as reflux.  probability of a cardiac or coronary artery disease related pain is very low because of: atypical pain, age, lack of other lifestyle risk factors.     Likely chest wall pain from coughing    Instructions on use of OTC medications given  Call or return to clinic if these symptoms worsen or fail to improve as anticipated.    Yobani Malik MD, MPH

## 2020-01-20 NOTE — NURSING NOTE
"Chief Complaint   Patient presents with     Rib Pain     /84   Pulse 82   Temp 98.4  F (36.9  C) (Oral)   Resp 16   Ht 1.727 m (5' 8\")   Wt 76.7 kg (169 lb)   SpO2 98%   BMI 25.70 kg/m   Estimated body mass index is 25.7 kg/m  as calculated from the following:    Height as of this encounter: 1.727 m (5' 8\").    Weight as of this encounter: 76.7 kg (169 lb).  bp completed using cuff size: regular       Health Maintenance addressed:  PHQ9    Pt declines .    Yarelis Dudley, CMA, MA     "

## 2020-03-10 ENCOUNTER — HEALTH MAINTENANCE LETTER (OUTPATIENT)
Age: 35
End: 2020-03-10

## 2020-12-27 ENCOUNTER — HEALTH MAINTENANCE LETTER (OUTPATIENT)
Age: 35
End: 2020-12-27

## 2021-04-24 ENCOUNTER — HEALTH MAINTENANCE LETTER (OUTPATIENT)
Age: 36
End: 2021-04-24

## 2021-10-04 ENCOUNTER — HEALTH MAINTENANCE LETTER (OUTPATIENT)
Age: 36
End: 2021-10-04

## 2022-04-04 ENCOUNTER — MEDICAL CORRESPONDENCE (OUTPATIENT)
Dept: HEALTH INFORMATION MANAGEMENT | Facility: CLINIC | Age: 37
End: 2022-04-04
Payer: COMMERCIAL

## 2022-04-04 DIAGNOSIS — Z01.818 PREOPERATIVE EXAMINATION: Primary | ICD-10-CM

## 2022-05-15 ENCOUNTER — HEALTH MAINTENANCE LETTER (OUTPATIENT)
Age: 37
End: 2022-05-15

## 2022-06-02 ENCOUNTER — TRANSCRIBE ORDERS (OUTPATIENT)
Dept: MATERNAL FETAL MEDICINE | Facility: CLINIC | Age: 37
End: 2022-06-02
Payer: COMMERCIAL

## 2022-06-02 DIAGNOSIS — O26.90 PREGNANCY RELATED CONDITION: Primary | ICD-10-CM

## 2022-06-06 ENCOUNTER — PRE VISIT (OUTPATIENT)
Dept: MATERNAL FETAL MEDICINE | Facility: CLINIC | Age: 37
End: 2022-06-06
Payer: COMMERCIAL

## 2022-06-09 ENCOUNTER — OFFICE VISIT (OUTPATIENT)
Dept: MATERNAL FETAL MEDICINE | Facility: CLINIC | Age: 37
End: 2022-06-09
Attending: OBSTETRICS & GYNECOLOGY
Payer: COMMERCIAL

## 2022-06-09 ENCOUNTER — LAB (OUTPATIENT)
Dept: LAB | Facility: CLINIC | Age: 37
End: 2022-06-09
Attending: OBSTETRICS & GYNECOLOGY
Payer: COMMERCIAL

## 2022-06-09 ENCOUNTER — HOSPITAL ENCOUNTER (OUTPATIENT)
Dept: ULTRASOUND IMAGING | Facility: CLINIC | Age: 37
Discharge: HOME | End: 2022-06-09
Attending: OBSTETRICS & GYNECOLOGY
Payer: COMMERCIAL

## 2022-06-09 DIAGNOSIS — O26.90 PREGNANCY RELATED CONDITION: ICD-10-CM

## 2022-06-09 DIAGNOSIS — O09.529 AMA (ADVANCED MATERNAL AGE) MULTIGRAVIDA 35+, UNSPECIFIED TRIMESTER: Primary | ICD-10-CM

## 2022-06-09 DIAGNOSIS — O40.9XX0 POLYHYDRAMNIOS AFFECTING PREGNANCY: ICD-10-CM

## 2022-06-09 DIAGNOSIS — O28.3 ABNORMAL PRENATAL ULTRASOUND: ICD-10-CM

## 2022-06-09 DIAGNOSIS — O35.9XX0 SUSPECTED FETAL ABNORMALITY AFFECTING MANAGEMENT OF MOTHER, SINGLE OR UNSPECIFIED FETUS: ICD-10-CM

## 2022-06-09 DIAGNOSIS — O09.529 AMA (ADVANCED MATERNAL AGE) MULTIGRAVIDA 35+, UNSPECIFIED TRIMESTER: ICD-10-CM

## 2022-06-09 PROCEDURE — 76811 OB US DETAILED SNGL FETUS: CPT | Mod: 26 | Performed by: OBSTETRICS & GYNECOLOGY

## 2022-06-09 PROCEDURE — 96040 HC GENETIC COUNSELING, EACH 30 MINUTES: CPT | Performed by: GENETIC COUNSELOR, MS

## 2022-06-09 PROCEDURE — 36415 COLL VENOUS BLD VENIPUNCTURE: CPT

## 2022-06-09 PROCEDURE — 76811 OB US DETAILED SNGL FETUS: CPT

## 2022-06-09 NOTE — PROGRESS NOTES
[facility] M[facility]etal [facility]  Genetic Counseling Consult    Patient:  Tracy Dalton YOB: 1985   Date of Service:  22      Tracy Dalton was seen at the [facility] for genetic consultation at the request of Dr. Hector due to the possible double-bubble sign, suboptimal views, polyhydramnios, and possible atypical profile identified on her comprehensive ultrasound. Tracy was unaccompanied to today's visit.       Impression/Plan:   1. 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 The patient had NIPT through [facility]. Results are expected within 5-10 days, and will be available in [employer].  We will contact her to discuss the results, and a copy will be forwarded to the office of the referring OB provider. Tracy provided verbal permission for detailed results to be left on her voicemail. Tracy opted to include screening for the sex chromosome conditions but out of fetal sex reporting. She is aware that a high-risk result for a sex chromosome aneuploidy would require disclosure of fetal sex information.    Pregnancy History:   /Parity:    Age at Delivery: 37 year old  ROMERO: 2022, by Ultrasound  Gestational Age: 25w4d    No significant complications or exposures were reported in the current pregnancy.    Tracy villalta pregnancy history is significant for:  o 2019 full-term pregnancy, female, alive and well.       Risk Assessment for Chromosome Conditions:   We explained that the risk for fetal chromosome abnormalities increases with maternal age. We discussed specific features of common chromosome abnormalities, including Down syndrome, trisomy 13, trisomy 18, and sex chromosome conditions      - At age 37 at midtrimester, the risk to have a baby with Down  syndrome is 1 in 274.     - At age 37 at midtrimester, the risk to have a baby with any chromosome abnormality is 1 in 135.     Today's ultrasound identified a hyperechoic area in the fetal abdomen that could be consistent with double-bubble sign. Double-bubble sign refers to two air-filled bubbles in the abdomen that can represent a small bowel obstruction. Double-bubble sign is commonly associated with duodenal atresia. Approximately 25-30% of fetuses with duodenal atresia have Down syndrome. The finding of polyhydramnios is also consistent with duodenal atresia as the fetus swallows the amniotic fluid but it cannot pass through the digestive tract. Although the nasal bone could not be accurately measured today, Dr. Hector does feel that the fetal profile is atypical. All of the features identified today, along with Tracy's age at delivery, increase the chance that this fetus has Down syndrome.     If the fetus does not have Down syndrome, it is possible that these features are due to another underlying genetic condition. Since polyhydramnios can also be associated with an underlying neurological condition in the fetus, another genetic etiology is certainly possible.     Tracy was tearful and overwhelmed during our discussion. We discussed that some couples may not wish to continue a pregnancy with an underlying genetic condition. We reviewed that Tracy is outside of the window for termination in [place], but that there are other options outside of the state. In the event that a diagnosis of Down syndrome is made and the pregnancy is ongoing, we reviewed additional monitoring that will occur during the pregnancy.     Testing Options:   We discussed the following options:   Non-invasive Prenatal Testing (NIPT)    Maternal plasma cell-free DNA testing; first trimester ultrasound with nuchal translucency and nasal bone assessment is recommended, when appropriate    Screens for fetal trisomy 21, trisomy 13,  trisomy 18, and sex chromosome aneuploidy    Cannot screen for open neural tube defects; maternal serum AFP after 15 weeks is recommended     Genetic Amniocentesis    Invasive procedure typically performed in the second trimester by which amniotic fluid is obtained for the purpose of chromosome analysis and/or other prenatal genetic analysis    Diagnostic results; >99% sensitivity for fetal chromosome abnormalities    AFAFP measurement tests for open neural tube defects     Comprehensive (Level II) ultrasound: Detailed ultrasound performed between 18-22 weeks gestation to screen for major birth defects and markers for aneuploidy.     Fetal echocardiogram: A detailed cardiac ultrasound performed between 20 and 24 weeks to screen for congenital heart defects. Fetal echocardiogram cannot definitively diagnose or exclude the presence of all congenital heart defects, but is more detailed than a level II ultrasound alone. In some cases, the fetal echo will be read by the pediatric cardiology team.      We reviewed the benefits and limitations of this testing.  Screening tests provide a risk assessment specific to the pregnancy for certain fetal chromosome abnormalities, but cannot definitively diagnose or exclude a fetal chromosome abnormality.  Follow-up genetic counseling and consideration of diagnostic testing is recommended with any abnormal screening result.     Diagnostic tests carry inherent risks- including risk of miscarriage- that require careful consideration.  These tests can detect fetal chromosome abnormalities with greater than 99% certainty.  Results can be compromised by maternal cell contamination or mosaicism, and are limited by the resolution of cytogenetic G-banding technology.  There is no screening nor diagnostic test that can detect all forms of birth defects or mental disability.    It was a pleasure to be involved with [facility]. Face-to-face time of the meeting was 20 minutes.      Nay  MS Haim, [facility]  Licensed Genetic Counselor  [facility]  Maternal Fetal Medicine  [email or web address]  [phone]

## 2022-06-09 NOTE — PROGRESS NOTES
Tracy Dalton was seen for an ultrasound today at the Maternal-Fetal Medicine center.      For the details of the ultrasound please see the report which can be found under the imaging tab.      Terri Hector MD  , OB/GYN  Maternal-Fetal Medicine  [email or web address]  [phone] (Main MFM Office)  [phone] or [phone] (for 24 hour MFM questions)  [phone] (Pager)

## 2022-06-15 ENCOUNTER — TELEPHONE (OUTPATIENT)
Dept: MATERNAL FETAL MEDICINE | Facility: CLINIC | Age: 37
End: 2022-06-15
Payer: COMMERCIAL

## 2022-06-15 LAB — SCANNED LAB RESULT: NORMAL

## 2022-06-15 NOTE — TELEPHONE ENCOUNTER
Karla 15, 2022    I spoke with Tracy regarding her NIPT results.     Results indicate NO ANEUPLOIDY DETECTED for chromosomes 21, 18, 13, or the sex chromosomes.    This puts her current pregnancy at low risk for Down syndrome, trisomy 18, trisomy 13 and sex chromosome abnormalities. This test is reported to have the following sensitivities: Down syndrome- >99.9%, trisomy 18- >99.9%, and trisomy 13- >99.9%. Although these results are reassuring, this does not replace a standard chromosome analysis from a chorionic villus sampling or amniocentesis.     We reviewed that diagnostic testing remains available to Tracy if she desires. However, this is a highly accurate screen for Down syndrome and the chance for that condition is significantly reduced. Although rare false negatives do occur, we may need to be thinking about other reasons why the fetus could have the features identified with MFM. We also reviewed that this screening is not comprehensive for all potential underlying genetic conditions. As Tracy returns to [facility] and we learn more about the fetal presentation, we can always have a further conversation about genetic testing options.    Her results are available in her Epic chart for her primary OB to review.     Nay Whitmore MS, [facility]  Licensed Genetic Counselor  [facility]  Maternal Fetal Medicine  [email or web address]  [phone]

## 2022-06-23 ENCOUNTER — OFFICE VISIT (OUTPATIENT)
Dept: MATERNAL FETAL MEDICINE | Facility: CLINIC | Age: 37
End: 2022-06-23
Attending: OBSTETRICS & GYNECOLOGY
Payer: COMMERCIAL

## 2022-06-23 ENCOUNTER — HOSPITAL ENCOUNTER (OUTPATIENT)
Dept: ULTRASOUND IMAGING | Facility: CLINIC | Age: 37
Discharge: HOME | End: 2022-06-23
Attending: OBSTETRICS & GYNECOLOGY
Payer: COMMERCIAL

## 2022-06-23 DIAGNOSIS — O40.9XX0 POLYHYDRAMNIOS AFFECTING PREGNANCY: Primary | ICD-10-CM

## 2022-06-23 DIAGNOSIS — O40.9XX0 POLYHYDRAMNIOS AFFECTING PREGNANCY: ICD-10-CM

## 2022-06-23 PROCEDURE — 76815 OB US LIMITED FETUS(S): CPT | Mod: 26 | Performed by: OBSTETRICS & GYNECOLOGY

## 2022-06-23 PROCEDURE — 76815 OB US LIMITED FETUS(S): CPT

## 2022-06-24 ENCOUNTER — TRANSFERRED RECORDS (OUTPATIENT)
Dept: MATERNAL FETAL MEDICINE | Facility: CLINIC | Age: 37
End: 2022-06-24

## 2022-06-24 NOTE — PROGRESS NOTES
"Please see \"Imaging\" tab under \"Chart Review\" for details of today's ultrasound.    Chase Mayorga M.D.  Specialist in Maternal-Fetal Medicine     "

## 2022-07-07 ENCOUNTER — HOSPITAL ENCOUNTER (OUTPATIENT)
Dept: ULTRASOUND IMAGING | Facility: CLINIC | Age: 37
Discharge: HOME | End: 2022-07-07
Attending: OBSTETRICS & GYNECOLOGY
Payer: COMMERCIAL

## 2022-07-07 ENCOUNTER — OFFICE VISIT (OUTPATIENT)
Dept: MATERNAL FETAL MEDICINE | Facility: CLINIC | Age: 37
End: 2022-07-07
Attending: OBSTETRICS & GYNECOLOGY
Payer: COMMERCIAL

## 2022-07-07 DIAGNOSIS — O40.9XX0 POLYHYDRAMNIOS AFFECTING PREGNANCY: Primary | ICD-10-CM

## 2022-07-07 DIAGNOSIS — O40.9XX0 POLYHYDRAMNIOS AFFECTING PREGNANCY: ICD-10-CM

## 2022-07-07 DIAGNOSIS — O35.9XX0 SUSPECTED FETAL ABNORMALITY AFFECTING MANAGEMENT OF MOTHER, SINGLE OR UNSPECIFIED FETUS: ICD-10-CM

## 2022-07-07 PROCEDURE — 76816 OB US FOLLOW-UP PER FETUS: CPT

## 2022-07-07 PROCEDURE — 76816 OB US FOLLOW-UP PER FETUS: CPT | Mod: 26 | Performed by: OBSTETRICS & GYNECOLOGY

## 2022-07-07 PROCEDURE — 76819 FETAL BIOPHYS PROFIL W/O NST: CPT | Mod: 26 | Performed by: OBSTETRICS & GYNECOLOGY

## 2022-07-07 NOTE — PROGRESS NOTES
Please see full imaging report from [employer] program under imaging tab.    Thank-you for referring your patient to assess fetal growth and amniotic fluid volume. She was previously noted to have moderate polyhydramnios, accelerated fetal growth, and stomach findings concerning for possible developing GI obstruction. Today the fluid level is now severely increased.     Additionally we discussed the finding of severe polyhydramnios on ultrasound today. She reports being diagnosed with moderate polyhydramnios on ultrasound at 25 weeks. She had glucose testing  which was normal.  We discussed that based on our measurements today her polyhydramnios is considered to be severe.  We discussed that worsening degrees of polyhydramnios (moderate or severe) are more likely to be associated with underlying fetal abnormalities (up to 10-20% with moderate polyhydramnios and up to 20-40% with severe polyhydramnios). We discussed that etiologies for polyhydramnios include abnormalities which impair swallowing such as central nervous system abnormalities, cleft palate, micrognathia, abnormalities that compress the trachea, gastrointestinal obstructions including TE fistula, or muscular disorders such as myotonic dystrophy.      None of the anomalies commonly associated with polyhydramnios have been identified. The recommendations in the setting of severe (ALY 35+ cm) polyhydramnios include delivery option at 37 weeks along with weekly  testing.  If this finding is persistent, delivery at a pediatric subspecialty center like [facility] should be considered, due to the association of severe polyhydramnios with an increased risk of fetal abnormalities and  complications.     She will continue OB care with her primary team at this time, but will have weekly US with MFM. We will repeat growth in 4 weeks and will arrange for consultation at [facility] clinic in  anticipation of potential delivery there if there is evidence  of persistent or worsening polyhydramnios over the next few visits.     We also reviewed amnio-reduction if she develops significant respiratory symptoms. She is more tired and achy but is still able to work out on a regular basis. We discussed the potential need to do amniocentesis with monitoring afterwards in the hospital, given her gestational age. We also discussed sending the fluid for karyotype if an amnio-reduction is required and they are in agreement with this plan.      Return to primary provider for continued prenatal care.    If you have questions regarding today's evaluation or if we can be of further service, please contact the Maternal-Fetal Medicine Center.    Jesús Lui MD  Maternal Fetal Medicine

## 2022-07-14 ENCOUNTER — OFFICE VISIT (OUTPATIENT)
Dept: MATERNAL FETAL MEDICINE | Facility: CLINIC | Age: 37
End: 2022-07-14
Attending: OBSTETRICS & GYNECOLOGY
Payer: COMMERCIAL

## 2022-07-14 ENCOUNTER — HOSPITAL ENCOUNTER (OUTPATIENT)
Dept: ULTRASOUND IMAGING | Facility: CLINIC | Age: 37
Discharge: HOME | End: 2022-07-14
Attending: OBSTETRICS & GYNECOLOGY
Payer: COMMERCIAL

## 2022-07-14 DIAGNOSIS — O40.9XX0 POLYHYDRAMNIOS AFFECTING PREGNANCY: Primary | ICD-10-CM

## 2022-07-14 DIAGNOSIS — O40.9XX0 POLYHYDRAMNIOS AFFECTING PREGNANCY: ICD-10-CM

## 2022-07-14 PROCEDURE — 76816 OB US FOLLOW-UP PER FETUS: CPT | Mod: 26 | Performed by: OBSTETRICS & GYNECOLOGY

## 2022-07-14 PROCEDURE — 76819 FETAL BIOPHYS PROFIL W/O NST: CPT | Mod: 26 | Performed by: OBSTETRICS & GYNECOLOGY

## 2022-07-14 PROCEDURE — 76819 FETAL BIOPHYS PROFIL W/O NST: CPT

## 2022-07-14 NOTE — PROGRESS NOTES
"Please see \"Imaging\" tab under \"Chart Review\" for details of today's US.    Poppy Addison, DO    "

## 2022-07-21 ENCOUNTER — HOSPITAL ENCOUNTER (OUTPATIENT)
Dept: ULTRASOUND IMAGING | Facility: CLINIC | Age: 37
Discharge: HOME | End: 2022-07-21
Attending: OBSTETRICS & GYNECOLOGY
Payer: COMMERCIAL

## 2022-07-21 ENCOUNTER — OFFICE VISIT (OUTPATIENT)
Dept: MATERNAL FETAL MEDICINE | Facility: CLINIC | Age: 37
End: 2022-07-21
Attending: OBSTETRICS & GYNECOLOGY
Payer: COMMERCIAL

## 2022-07-21 DIAGNOSIS — O40.9XX0 POLYHYDRAMNIOS AFFECTING PREGNANCY: ICD-10-CM

## 2022-07-21 DIAGNOSIS — O40.9XX0 POLYHYDRAMNIOS AFFECTING PREGNANCY: Primary | ICD-10-CM

## 2022-07-21 PROCEDURE — 76819 FETAL BIOPHYS PROFIL W/O NST: CPT

## 2022-07-21 PROCEDURE — 76819 FETAL BIOPHYS PROFIL W/O NST: CPT | Mod: 26 | Performed by: OBSTETRICS & GYNECOLOGY

## 2022-07-21 NOTE — PROGRESS NOTES
Please see full imaging report from [employer] program under imaging tab.    Jesús Lui MD  Maternal Fetal Medicine

## 2022-07-28 ENCOUNTER — OFFICE VISIT (OUTPATIENT)
Dept: MATERNAL FETAL MEDICINE | Facility: CLINIC | Age: 37
End: 2022-07-28
Attending: OBSTETRICS & GYNECOLOGY
Payer: COMMERCIAL

## 2022-07-28 ENCOUNTER — HOSPITAL ENCOUNTER (OUTPATIENT)
Dept: ULTRASOUND IMAGING | Facility: CLINIC | Age: 37
Discharge: HOME | End: 2022-07-28
Attending: OBSTETRICS & GYNECOLOGY
Payer: COMMERCIAL

## 2022-07-28 DIAGNOSIS — O40.9XX0 POLYHYDRAMNIOS AFFECTING PREGNANCY: Primary | ICD-10-CM

## 2022-07-28 DIAGNOSIS — O40.9XX0 POLYHYDRAMNIOS AFFECTING PREGNANCY: ICD-10-CM

## 2022-07-28 PROCEDURE — 76819 FETAL BIOPHYS PROFIL W/O NST: CPT | Mod: 26 | Performed by: OBSTETRICS & GYNECOLOGY

## 2022-07-28 PROCEDURE — 76819 FETAL BIOPHYS PROFIL W/O NST: CPT

## 2022-08-04 ENCOUNTER — OFFICE VISIT (OUTPATIENT)
Dept: MATERNAL FETAL MEDICINE | Facility: CLINIC | Age: 37
End: 2022-08-04
Attending: OBSTETRICS & GYNECOLOGY
Payer: COMMERCIAL

## 2022-08-04 ENCOUNTER — HOSPITAL ENCOUNTER (OUTPATIENT)
Dept: ULTRASOUND IMAGING | Facility: CLINIC | Age: 37
Discharge: HOME | End: 2022-08-04
Attending: OBSTETRICS & GYNECOLOGY
Payer: COMMERCIAL

## 2022-08-04 DIAGNOSIS — O40.9XX0 POLYHYDRAMNIOS AFFECTING PREGNANCY: ICD-10-CM

## 2022-08-04 DIAGNOSIS — O40.9XX0 POLYHYDRAMNIOS AFFECTING PREGNANCY: Primary | ICD-10-CM

## 2022-08-04 DIAGNOSIS — O35.EXX0 ENCOUNTER FOR REPEAT ULTRASOUND OF FETAL PYELECTASIS, ANTEPARTUM, SINGLE OR UNSPECIFIED FETUS: ICD-10-CM

## 2022-08-04 PROCEDURE — G0463 HOSPITAL OUTPT CLINIC VISIT: HCPCS | Mod: 25

## 2022-08-04 PROCEDURE — 99214 OFFICE O/P EST MOD 30 MIN: CPT | Mod: 25 | Performed by: OBSTETRICS & GYNECOLOGY

## 2022-08-04 PROCEDURE — 76816 OB US FOLLOW-UP PER FETUS: CPT | Mod: 26 | Performed by: OBSTETRICS & GYNECOLOGY

## 2022-08-04 PROCEDURE — 76819 FETAL BIOPHYS PROFIL W/O NST: CPT | Mod: 26 | Performed by: OBSTETRICS & GYNECOLOGY

## 2022-08-04 PROCEDURE — 76816 OB US FOLLOW-UP PER FETUS: CPT

## 2022-08-04 NOTE — PROGRESS NOTES
The patient was seen for an ultrasound in the Maternal-Fetal Medicine Center at the [facility] today.  For a detailed report of the ultrasound examination, please see the ultrasound report which can be found under the imaging tab.    Thank-you for referring your patient to assess fetal growth. I discussed the findings on today's ultrasound with the patient.     We reviewed that today there is new mild left urinary tract dilation. We discussed that this is most likely to be a transient finding that resolves on subsequent scans or postnatally, but may indicate impending kidney disease in about 15% of cases. Urinary tract dilation can occur as a result of a blockage or congenital anatomic abnormality of the kidney, ureter, bladder or urethra, but is most commonly due to transient/physiologic changes. In circumstances when there is reflux or obstruction the urinary tract dilation may result in increased pressure in the calyces and injury to the renal parenchyma. With increasing dilation (specifically > 10mm), there is increasing risk of associated renal/ureteral/bladder/urethral malformations. The cutoff for mild urinary tract dilation is 7 mm in the 3rd trimester. We discussed a plan to reassess the fetal kidneys in 3 weeks. If there is persistent urinary tract dilation at that time then we will arrange for a prenatal consultation with  pediatric urology follow-up. If it has resolved at her subsequent ultrasound then no  evaluation is indicated.     Additionally we discussed that there continues to be severe polyhydramnios on ultrasound today. There has been moderate to severe polyhydramnios on ultrasound since 25 weeks. She had glucose testing at that time which was normal.  We discussed that based on our measurements today her polyhydramnios is considered to be severe. We discussed that worsening degrees of polyhydramnios (moderate or severe) are more likely to be associated with underlying  fetal abnormalities (up to 10-20% with moderate polyhydramnios and up to 20-40% with severe polyhydramnios). We discussed that etiologies for polyhydramnios include abnormalities which impair swallowing such as central nervous system abnormalities, cleft palate, micrognathia, abnormalities that compress the trachea, gastrointestinal obstructions including TE fistula, or muscular disorders such as myotonic dystrophy. Additionally, fetal abnormalities that cause a high-output cardiac state can also lead to polyhydramnios such as a sacrococcygeal teratoma, placental chorioangioma, or severe cardiac abnormalities. None of the anomalies commonly associated with polyhydramnios have been identified, however, some of these etiologies may only be detectable postnatally. We discussed that in this context if there continues to be severe polyhydramnios then delivery at [facility] with access to  subspeciality services may be recommended. We will continue to address this at subsequent ultrasounds based on the amniotic fluid volume.     We would recommend ongoing weekly  surveillance, repeat evaluation of fetal growth in 3 weeks, and likely delivery at 37 weeks if persistent severe polyhydramnios.    Return to primary provider for continued prenatal care.    If you have questions regarding today's evaluation or if we can be of further service, please contact the Maternal-Fetal Medicine Center.    **Fetal anomalies may be present but not detected*    At the end of our discussion Ms. Dalton voiced understanding of the plan of care and stated all of her questions had been answered. Thank you for the opportunity to participate in the care of your patient. Please do not hesitate to contact us if you may have any questions or concerns.     I spent a total of 8 minutes on the date of this encounter including preparing to see the patient (reviewing medical records/tests), in direct face-to-face contact with the  patient during her visit with the majority (>50%) spent counseling and discussing the plan of care, documenting the visit in the electronic medical record, and communicating with other health care professionals and/or care coordination.    Please see note for details.    Lu Devine MD  , OB/GYN  Maternal-Fetal Medicine  [phone] (Pager)

## 2022-08-10 ENCOUNTER — TRANSFERRED RECORDS (OUTPATIENT)
Dept: HEALTH INFORMATION MANAGEMENT | Facility: CLINIC | Age: 37
End: 2022-08-10

## 2022-08-11 ENCOUNTER — OFFICE VISIT (OUTPATIENT)
Dept: MATERNAL FETAL MEDICINE | Facility: CLINIC | Age: 37
End: 2022-08-11
Attending: OBSTETRICS & GYNECOLOGY
Payer: COMMERCIAL

## 2022-08-11 ENCOUNTER — HOSPITAL ENCOUNTER (OUTPATIENT)
Dept: ULTRASOUND IMAGING | Facility: CLINIC | Age: 37
Discharge: HOME | End: 2022-08-11
Attending: OBSTETRICS & GYNECOLOGY
Payer: COMMERCIAL

## 2022-08-11 DIAGNOSIS — O40.9XX0 POLYHYDRAMNIOS AFFECTING PREGNANCY: ICD-10-CM

## 2022-08-11 DIAGNOSIS — O40.3XX0 POLYHYDRAMNIOS AFFECTING PREGNANCY IN THIRD TRIMESTER: Primary | ICD-10-CM

## 2022-08-11 PROCEDURE — 76819 FETAL BIOPHYS PROFIL W/O NST: CPT

## 2022-08-11 PROCEDURE — 76819 FETAL BIOPHYS PROFIL W/O NST: CPT | Mod: 26 | Performed by: OBSTETRICS & GYNECOLOGY

## 2022-08-11 NOTE — NURSING NOTE
BPP 8/8, ALY 44. Patient has noted more SOB with activity. States she feels like if resting/sitting. Encouraged patient to take it easy and sit when she feels SOB. Reviewed with Dr Mayorga. If she becomes SOB while sitting, she was instructed to call her OB. COntinue to weekly BPP's and growth on 8/25/22. Verbalizes understanding.    Ya Schaeffer RN

## 2022-08-16 ENCOUNTER — LAB REQUISITION (OUTPATIENT)
Dept: LAB | Facility: CLINIC | Age: 37
End: 2022-08-16

## 2022-08-16 DIAGNOSIS — Z3A.36 36 WEEKS GESTATION OF PREGNANCY: ICD-10-CM

## 2022-08-16 PROCEDURE — 87653 STREP B DNA AMP PROBE: CPT | Performed by: OBSTETRICS & GYNECOLOGY

## 2022-08-16 PROCEDURE — 87077 CULTURE AEROBIC IDENTIFY: CPT | Performed by: OBSTETRICS & GYNECOLOGY

## 2022-08-17 LAB — GP B STREP DNA SPEC QL NAA+PROBE: POSITIVE

## 2022-08-18 ENCOUNTER — OFFICE VISIT (OUTPATIENT)
Dept: MATERNAL FETAL MEDICINE | Facility: CLINIC | Age: 37
End: 2022-08-18
Attending: OBSTETRICS & GYNECOLOGY
Payer: COMMERCIAL

## 2022-08-18 ENCOUNTER — HOSPITAL ENCOUNTER (OUTPATIENT)
Dept: ULTRASOUND IMAGING | Facility: CLINIC | Age: 37
Discharge: HOME | End: 2022-08-18
Attending: OBSTETRICS & GYNECOLOGY
Payer: COMMERCIAL

## 2022-08-18 DIAGNOSIS — O40.3XX0 POLYHYDRAMNIOS AFFECTING PREGNANCY IN THIRD TRIMESTER: Primary | ICD-10-CM

## 2022-08-18 DIAGNOSIS — O40.9XX0 POLYHYDRAMNIOS AFFECTING PREGNANCY: ICD-10-CM

## 2022-08-18 PROCEDURE — 76819 FETAL BIOPHYS PROFIL W/O NST: CPT | Mod: 26 | Performed by: OBSTETRICS & GYNECOLOGY

## 2022-08-18 PROCEDURE — 76819 FETAL BIOPHYS PROFIL W/O NST: CPT

## 2022-08-18 NOTE — PROGRESS NOTES
Please see full imaging report from [employer] program under imaging tab.    Severe polyhydramnios.     Jesús Lui MD  Maternal Fetal Medicine

## 2022-08-20 LAB — BACTERIA SPEC CULT: ABNORMAL

## 2022-08-23 ENCOUNTER — MEDICAL CORRESPONDENCE (OUTPATIENT)
Dept: HEALTH INFORMATION MANAGEMENT | Facility: CLINIC | Age: 37
End: 2022-08-23

## 2022-08-25 ENCOUNTER — HOSPITAL ENCOUNTER (OUTPATIENT)
Dept: ULTRASOUND IMAGING | Facility: CLINIC | Age: 37
Discharge: HOME | End: 2022-08-25
Attending: OBSTETRICS & GYNECOLOGY
Payer: COMMERCIAL

## 2022-08-25 ENCOUNTER — OFFICE VISIT (OUTPATIENT)
Dept: MATERNAL FETAL MEDICINE | Facility: CLINIC | Age: 37
End: 2022-08-25
Attending: OBSTETRICS & GYNECOLOGY
Payer: COMMERCIAL

## 2022-08-25 DIAGNOSIS — O40.9XX0 POLYHYDRAMNIOS AFFECTING PREGNANCY: ICD-10-CM

## 2022-08-25 DIAGNOSIS — O40.3XX0 POLYHYDRAMNIOS AFFECTING PREGNANCY IN THIRD TRIMESTER: Primary | ICD-10-CM

## 2022-08-25 PROCEDURE — 76816 OB US FOLLOW-UP PER FETUS: CPT | Mod: 26 | Performed by: OBSTETRICS & GYNECOLOGY

## 2022-08-25 PROCEDURE — 76816 OB US FOLLOW-UP PER FETUS: CPT

## 2022-08-25 PROCEDURE — 76819 FETAL BIOPHYS PROFIL W/O NST: CPT | Mod: 26 | Performed by: OBSTETRICS & GYNECOLOGY

## 2022-08-25 PROCEDURE — 76819 FETAL BIOPHYS PROFIL W/O NST: CPT

## 2022-08-25 NOTE — PROGRESS NOTES
Please see full imaging report from [employer] program under imaging tab.    ALY 45 cm, severe polyhydramnios  Delivery supported at 37 weeks with this diagnosis.     Jesús Lui MD  Maternal Fetal Medicine

## 2022-08-26 ENCOUNTER — LAB (OUTPATIENT)
Dept: URGENT CARE | Facility: URGENT CARE | Age: 37
End: 2022-08-26
Payer: COMMERCIAL

## 2022-08-26 DIAGNOSIS — Z20.822 ENCOUNTER FOR LABORATORY TESTING FOR COVID-19 VIRUS: ICD-10-CM

## 2022-08-26 LAB — SARS-COV-2 RNA RESP QL NAA+PROBE: NEGATIVE

## 2022-08-26 PROCEDURE — U0005 INFEC AGEN DETEC AMPLI PROBE: HCPCS

## 2022-08-26 PROCEDURE — U0003 INFECTIOUS AGENT DETECTION BY NUCLEIC ACID (DNA OR RNA); SEVERE ACUTE RESPIRATORY SYNDROME CORONAVIRUS 2 (SARS-COV-2) (CORONAVIRUS DISEASE [COVID-19]), AMPLIFIED PROBE TECHNIQUE, MAKING USE OF HIGH THROUGHPUT TECHNOLOGIES AS DESCRIBED BY CMS-2020-01-R: HCPCS

## 2022-08-28 LAB
ABO/RH(D): NORMAL
ANTIBODY SCREEN: NEGATIVE
SPECIMEN EXPIRATION DATE: NORMAL

## 2022-08-29 ENCOUNTER — LAB (OUTPATIENT)
Dept: LAB | Facility: CLINIC | Age: 37
End: 2022-08-29
Payer: COMMERCIAL

## 2022-08-29 DIAGNOSIS — Z01.818 PRE-OP TESTING: ICD-10-CM

## 2022-08-29 LAB
HGB BLD-MCNC: 11.6 G/DL (ref 11.7–15.7)
T PALLIDUM AB SER QL: NONREACTIVE

## 2022-08-29 PROCEDURE — 86780 TREPONEMA PALLIDUM: CPT

## 2022-08-29 PROCEDURE — 36415 COLL VENOUS BLD VENIPUNCTURE: CPT

## 2022-08-29 PROCEDURE — 86901 BLOOD TYPING SEROLOGIC RH(D): CPT

## 2022-08-29 PROCEDURE — 85018 HEMOGLOBIN: CPT

## 2022-08-30 ENCOUNTER — ANESTHESIA (OUTPATIENT)
Dept: OBGYN | Facility: CLINIC | Age: 37
End: 2022-08-30
Payer: COMMERCIAL

## 2022-08-30 ENCOUNTER — HOSPITAL ENCOUNTER (INPATIENT)
Facility: CLINIC | Age: 37
LOS: 3 days | Discharge: HOME | End: 2022-09-02
Attending: OBSTETRICS & GYNECOLOGY | Admitting: OBSTETRICS & GYNECOLOGY
Payer: COMMERCIAL

## 2022-08-30 ENCOUNTER — ANESTHESIA EVENT (OUTPATIENT)
Dept: OBGYN | Facility: CLINIC | Age: 37
End: 2022-08-30
Payer: COMMERCIAL

## 2022-08-30 DIAGNOSIS — Z98.891 S/P REPEAT LOW TRANSVERSE C-SECTION: Primary | ICD-10-CM

## 2022-08-30 DIAGNOSIS — Z98.891 S/P PRIMARY LOW TRANSVERSE C-SECTION: ICD-10-CM

## 2022-08-30 PROCEDURE — 370N000017 HC ANESTHESIA TECHNICAL FEE, PER MIN: Performed by: OBSTETRICS & GYNECOLOGY

## 2022-08-30 PROCEDURE — 710N000009 HC RECOVERY PHASE 1, LEVEL 1, PER MIN: Performed by: OBSTETRICS & GYNECOLOGY

## 2022-08-30 PROCEDURE — 250N000011 HC RX IP 250 OP 636: Performed by: NURSE ANESTHETIST, CERTIFIED REGISTERED

## 2022-08-30 PROCEDURE — 258N000003 HC RX IP 258 OP 636: Performed by: NURSE ANESTHETIST, CERTIFIED REGISTERED

## 2022-08-30 PROCEDURE — 258N000003 HC RX IP 258 OP 636: Performed by: OBSTETRICS & GYNECOLOGY

## 2022-08-30 PROCEDURE — 272N000001 HC OR GENERAL SUPPLY STERILE: Performed by: OBSTETRICS & GYNECOLOGY

## 2022-08-30 PROCEDURE — 250N000011 HC RX IP 250 OP 636: Performed by: OBSTETRICS & GYNECOLOGY

## 2022-08-30 PROCEDURE — 120N000012 HC R&B POSTPARTUM

## 2022-08-30 PROCEDURE — 250N000013 HC RX MED GY IP 250 OP 250 PS 637

## 2022-08-30 PROCEDURE — 250N000013 HC RX MED GY IP 250 OP 250 PS 637: Performed by: OBSTETRICS & GYNECOLOGY

## 2022-08-30 PROCEDURE — 360N000076 HC SURGERY LEVEL 3, PER MIN: Performed by: OBSTETRICS & GYNECOLOGY

## 2022-08-30 PROCEDURE — 250N000011 HC RX IP 250 OP 636: Performed by: ANESTHESIOLOGY

## 2022-08-30 PROCEDURE — 250N000009 HC RX 250: Performed by: NURSE ANESTHETIST, CERTIFIED REGISTERED

## 2022-08-30 RX ORDER — CEFAZOLIN SODIUM/WATER 2 G/20 ML
2 SYRINGE (ML) INTRAVENOUS
Status: COMPLETED | OUTPATIENT
Start: 2022-08-30 | End: 2022-08-30

## 2022-08-30 RX ORDER — ACETAMINOPHEN 325 MG/1
TABLET ORAL
Status: DISCONTINUED
Start: 2022-08-30 | End: 2022-08-30 | Stop reason: HOSPADM

## 2022-08-30 RX ORDER — OXYTOCIN/0.9 % SODIUM CHLORIDE 30/500 ML
340 PLASTIC BAG, INJECTION (ML) INTRAVENOUS CONTINUOUS PRN
Status: DISCONTINUED | OUTPATIENT
Start: 2022-08-30 | End: 2022-09-02 | Stop reason: HOSPADM

## 2022-08-30 RX ORDER — NALOXONE HYDROCHLORIDE 0.4 MG/ML
0.2 INJECTION, SOLUTION INTRAMUSCULAR; INTRAVENOUS; SUBCUTANEOUS
Status: DISCONTINUED | OUTPATIENT
Start: 2022-08-30 | End: 2022-09-02 | Stop reason: HOSPADM

## 2022-08-30 RX ORDER — DEXTROSE, SODIUM CHLORIDE, SODIUM LACTATE, POTASSIUM CHLORIDE, AND CALCIUM CHLORIDE 5; .6; .31; .03; .02 G/100ML; G/100ML; G/100ML; G/100ML; G/100ML
INJECTION, SOLUTION INTRAVENOUS CONTINUOUS
Status: DISCONTINUED | OUTPATIENT
Start: 2022-08-30 | End: 2022-09-02 | Stop reason: HOSPADM

## 2022-08-30 RX ORDER — LIDOCAINE 40 MG/G
CREAM TOPICAL
Status: DISCONTINUED | OUTPATIENT
Start: 2022-08-30 | End: 2022-09-02 | Stop reason: HOSPADM

## 2022-08-30 RX ORDER — CITRIC ACID/SODIUM CITRATE 334-500MG
SOLUTION, ORAL ORAL
Status: COMPLETED
Start: 2022-08-30 | End: 2022-08-30

## 2022-08-30 RX ORDER — OXYTOCIN/0.9 % SODIUM CHLORIDE 30/500 ML
340 PLASTIC BAG, INJECTION (ML) INTRAVENOUS CONTINUOUS PRN
Status: DISCONTINUED | OUTPATIENT
Start: 2022-08-30 | End: 2022-08-30 | Stop reason: HOSPADM

## 2022-08-30 RX ORDER — BUPIVACAINE HYDROCHLORIDE 7.5 MG/ML
INJECTION, SOLUTION INTRASPINAL
Status: COMPLETED | OUTPATIENT
Start: 2022-08-30 | End: 2022-08-30

## 2022-08-30 RX ORDER — OXYTOCIN/0.9 % SODIUM CHLORIDE 30/500 ML
100-340 PLASTIC BAG, INJECTION (ML) INTRAVENOUS CONTINUOUS PRN
Status: DISCONTINUED | OUTPATIENT
Start: 2022-08-30 | End: 2022-09-02 | Stop reason: HOSPADM

## 2022-08-30 RX ORDER — NALOXONE HYDROCHLORIDE 0.4 MG/ML
0.4 INJECTION, SOLUTION INTRAMUSCULAR; INTRAVENOUS; SUBCUTANEOUS
Status: DISCONTINUED | OUTPATIENT
Start: 2022-08-30 | End: 2022-09-02 | Stop reason: HOSPADM

## 2022-08-30 RX ORDER — METOCLOPRAMIDE HYDROCHLORIDE 5 MG/ML
10 INJECTION INTRAMUSCULAR; INTRAVENOUS EVERY 6 HOURS PRN
Status: DISCONTINUED | OUTPATIENT
Start: 2022-08-30 | End: 2022-09-02 | Stop reason: HOSPADM

## 2022-08-30 RX ORDER — OXYTOCIN 10 [USP'U]/ML
10 INJECTION, SOLUTION INTRAMUSCULAR; INTRAVENOUS
Status: DISCONTINUED | OUTPATIENT
Start: 2022-08-30 | End: 2022-08-30 | Stop reason: HOSPADM

## 2022-08-30 RX ORDER — CARBOPROST TROMETHAMINE 250 UG/ML
250 INJECTION, SOLUTION INTRAMUSCULAR
Status: DISCONTINUED | OUTPATIENT
Start: 2022-08-30 | End: 2022-08-30 | Stop reason: HOSPADM

## 2022-08-30 RX ORDER — AMOXICILLIN 250 MG
1 CAPSULE ORAL 2 TIMES DAILY
Status: DISCONTINUED | OUTPATIENT
Start: 2022-08-30 | End: 2022-09-02 | Stop reason: HOSPADM

## 2022-08-30 RX ORDER — OXYCODONE HYDROCHLORIDE 5 MG/1
5 TABLET ORAL EVERY 4 HOURS PRN
Status: DISCONTINUED | OUTPATIENT
Start: 2022-08-30 | End: 2022-09-02 | Stop reason: HOSPADM

## 2022-08-30 RX ORDER — ACETAMINOPHEN 325 MG/1
975 TABLET ORAL EVERY 6 HOURS
Status: DISCONTINUED | OUTPATIENT
Start: 2022-08-30 | End: 2022-09-02 | Stop reason: HOSPADM

## 2022-08-30 RX ORDER — OXYTOCIN/0.9 % SODIUM CHLORIDE 30/500 ML
PLASTIC BAG, INJECTION (ML) INTRAVENOUS CONTINUOUS PRN
Status: DISCONTINUED | OUTPATIENT
Start: 2022-08-30 | End: 2022-08-30

## 2022-08-30 RX ORDER — MISOPROSTOL 200 UG/1
800 TABLET ORAL
Status: DISCONTINUED | OUTPATIENT
Start: 2022-08-30 | End: 2022-08-30 | Stop reason: HOSPADM

## 2022-08-30 RX ORDER — ONDANSETRON 2 MG/ML
4 INJECTION INTRAMUSCULAR; INTRAVENOUS EVERY 6 HOURS PRN
Status: DISCONTINUED | OUTPATIENT
Start: 2022-08-30 | End: 2022-09-02 | Stop reason: HOSPADM

## 2022-08-30 RX ORDER — SODIUM CHLORIDE, SODIUM LACTATE, POTASSIUM CHLORIDE, CALCIUM CHLORIDE 600; 310; 30; 20 MG/100ML; MG/100ML; MG/100ML; MG/100ML
INJECTION, SOLUTION INTRAVENOUS CONTINUOUS
Status: DISCONTINUED | OUTPATIENT
Start: 2022-08-30 | End: 2022-08-30 | Stop reason: HOSPADM

## 2022-08-30 RX ORDER — OXYTOCIN 10 [USP'U]/ML
10 INJECTION, SOLUTION INTRAMUSCULAR; INTRAVENOUS
Status: DISCONTINUED | OUTPATIENT
Start: 2022-08-30 | End: 2022-09-02 | Stop reason: HOSPADM

## 2022-08-30 RX ORDER — LIDOCAINE 40 MG/G
CREAM TOPICAL
Status: DISCONTINUED | OUTPATIENT
Start: 2022-08-30 | End: 2022-08-30 | Stop reason: HOSPADM

## 2022-08-30 RX ORDER — CITRIC ACID/SODIUM CITRATE 334-500MG
30 SOLUTION, ORAL ORAL
Status: DISCONTINUED | OUTPATIENT
Start: 2022-08-30 | End: 2022-08-30 | Stop reason: HOSPADM

## 2022-08-30 RX ORDER — CEFAZOLIN SODIUM/WATER 2 G/20 ML
SYRINGE (ML) INTRAVENOUS
Status: DISCONTINUED
Start: 2022-08-30 | End: 2022-08-30 | Stop reason: HOSPADM

## 2022-08-30 RX ORDER — ACETAMINOPHEN 325 MG/1
975 TABLET ORAL ONCE
Status: COMPLETED | OUTPATIENT
Start: 2022-08-30 | End: 2022-08-30

## 2022-08-30 RX ORDER — METHYLERGONOVINE MALEATE 0.2 MG/ML
200 INJECTION INTRAVENOUS
Status: DISCONTINUED | OUTPATIENT
Start: 2022-08-30 | End: 2022-09-02 | Stop reason: HOSPADM

## 2022-08-30 RX ORDER — PROCHLORPERAZINE MALEATE 10 MG
10 TABLET ORAL EVERY 6 HOURS PRN
Status: DISCONTINUED | OUTPATIENT
Start: 2022-08-30 | End: 2022-09-02 | Stop reason: HOSPADM

## 2022-08-30 RX ORDER — ONDANSETRON 4 MG/1
4 TABLET, ORALLY DISINTEGRATING ORAL EVERY 6 HOURS PRN
Status: DISCONTINUED | OUTPATIENT
Start: 2022-08-30 | End: 2022-09-02 | Stop reason: HOSPADM

## 2022-08-30 RX ORDER — SIMETHICONE 80 MG
80 TABLET,CHEWABLE ORAL 4 TIMES DAILY PRN
Status: DISCONTINUED | OUTPATIENT
Start: 2022-08-30 | End: 2022-09-02 | Stop reason: HOSPADM

## 2022-08-30 RX ORDER — KETOROLAC TROMETHAMINE 30 MG/ML
30 INJECTION, SOLUTION INTRAMUSCULAR; INTRAVENOUS EVERY 6 HOURS
Status: COMPLETED | OUTPATIENT
Start: 2022-08-30 | End: 2022-08-31

## 2022-08-30 RX ORDER — MISOPROSTOL 200 UG/1
400 TABLET ORAL
Status: DISCONTINUED | OUTPATIENT
Start: 2022-08-30 | End: 2022-08-30 | Stop reason: HOSPADM

## 2022-08-30 RX ORDER — IBUPROFEN 400 MG/1
800 TABLET, FILM COATED ORAL EVERY 6 HOURS
Status: DISCONTINUED | OUTPATIENT
Start: 2022-08-31 | End: 2022-09-02 | Stop reason: HOSPADM

## 2022-08-30 RX ORDER — MODIFIED LANOLIN
OINTMENT (GRAM) TOPICAL
Status: DISCONTINUED | OUTPATIENT
Start: 2022-08-30 | End: 2022-09-02 | Stop reason: HOSPADM

## 2022-08-30 RX ORDER — ONDANSETRON 2 MG/ML
4 INJECTION INTRAMUSCULAR; INTRAVENOUS EVERY 6 HOURS PRN
Status: DISCONTINUED | OUTPATIENT
Start: 2022-08-30 | End: 2022-08-30 | Stop reason: HOSPADM

## 2022-08-30 RX ORDER — METHYLERGONOVINE MALEATE 0.2 MG/ML
200 INJECTION INTRAVENOUS
Status: DISCONTINUED | OUTPATIENT
Start: 2022-08-30 | End: 2022-08-30 | Stop reason: HOSPADM

## 2022-08-30 RX ORDER — TRANEXAMIC ACID 10 MG/ML
1 INJECTION, SOLUTION INTRAVENOUS EVERY 30 MIN PRN
Status: DISCONTINUED | OUTPATIENT
Start: 2022-08-30 | End: 2022-08-30 | Stop reason: HOSPADM

## 2022-08-30 RX ORDER — ONDANSETRON 2 MG/ML
INJECTION INTRAMUSCULAR; INTRAVENOUS PRN
Status: DISCONTINUED | OUTPATIENT
Start: 2022-08-30 | End: 2022-08-30

## 2022-08-30 RX ORDER — MISOPROSTOL 200 UG/1
800 TABLET ORAL
Status: DISCONTINUED | OUTPATIENT
Start: 2022-08-30 | End: 2022-09-02 | Stop reason: HOSPADM

## 2022-08-30 RX ORDER — HYDROCORTISONE 25 MG/G
CREAM TOPICAL 3 TIMES DAILY PRN
Status: DISCONTINUED | OUTPATIENT
Start: 2022-08-30 | End: 2022-09-02 | Stop reason: HOSPADM

## 2022-08-30 RX ORDER — ONDANSETRON 2 MG/ML
INJECTION INTRAMUSCULAR; INTRAVENOUS
Status: DISCONTINUED
Start: 2022-08-30 | End: 2022-08-30 | Stop reason: HOSPADM

## 2022-08-30 RX ORDER — PROCHLORPERAZINE 25 MG
25 SUPPOSITORY, RECTAL RECTAL EVERY 12 HOURS PRN
Status: DISCONTINUED | OUTPATIENT
Start: 2022-08-30 | End: 2022-09-02 | Stop reason: HOSPADM

## 2022-08-30 RX ORDER — AMOXICILLIN 250 MG
2 CAPSULE ORAL 2 TIMES DAILY
Status: DISCONTINUED | OUTPATIENT
Start: 2022-08-30 | End: 2022-09-02 | Stop reason: HOSPADM

## 2022-08-30 RX ORDER — MORPHINE SULFATE 1 MG/ML
INJECTION, SOLUTION EPIDURAL; INTRATHECAL; INTRAVENOUS
Status: COMPLETED | OUTPATIENT
Start: 2022-08-30 | End: 2022-08-30

## 2022-08-30 RX ORDER — TRANEXAMIC ACID 10 MG/ML
1 INJECTION, SOLUTION INTRAVENOUS EVERY 30 MIN PRN
Status: DISCONTINUED | OUTPATIENT
Start: 2022-08-30 | End: 2022-09-02 | Stop reason: HOSPADM

## 2022-08-30 RX ORDER — NALBUPHINE HYDROCHLORIDE 10 MG/ML
2.5-5 INJECTION, SOLUTION INTRAMUSCULAR; INTRAVENOUS; SUBCUTANEOUS EVERY 6 HOURS PRN
Status: DISCONTINUED | OUTPATIENT
Start: 2022-08-30 | End: 2022-09-02 | Stop reason: HOSPADM

## 2022-08-30 RX ORDER — MORPHINE SULFATE 1 MG/ML
150 INJECTION, SOLUTION EPIDURAL; INTRATHECAL; INTRAVENOUS ONCE
Status: DISCONTINUED | OUTPATIENT
Start: 2022-08-30 | End: 2022-08-30 | Stop reason: HOSPADM

## 2022-08-30 RX ORDER — MISOPROSTOL 200 UG/1
400 TABLET ORAL
Status: DISCONTINUED | OUTPATIENT
Start: 2022-08-30 | End: 2022-09-02 | Stop reason: HOSPADM

## 2022-08-30 RX ORDER — CEFAZOLIN SODIUM/WATER 2 G/20 ML
2 SYRINGE (ML) INTRAVENOUS SEE ADMIN INSTRUCTIONS
Status: DISCONTINUED | OUTPATIENT
Start: 2022-08-30 | End: 2022-08-30 | Stop reason: HOSPADM

## 2022-08-30 RX ORDER — EPHEDRINE SULFATE 50 MG/ML
INJECTION, SOLUTION INTRAMUSCULAR; INTRAVENOUS; SUBCUTANEOUS PRN
Status: DISCONTINUED | OUTPATIENT
Start: 2022-08-30 | End: 2022-08-30

## 2022-08-30 RX ORDER — ONDANSETRON 4 MG/1
4 TABLET, ORALLY DISINTEGRATING ORAL EVERY 6 HOURS PRN
Status: DISCONTINUED | OUTPATIENT
Start: 2022-08-30 | End: 2022-08-30 | Stop reason: HOSPADM

## 2022-08-30 RX ORDER — CARBOPROST TROMETHAMINE 250 UG/ML
250 INJECTION, SOLUTION INTRAMUSCULAR
Status: DISCONTINUED | OUTPATIENT
Start: 2022-08-30 | End: 2022-09-02 | Stop reason: HOSPADM

## 2022-08-30 RX ORDER — BISACODYL 10 MG
10 SUPPOSITORY, RECTAL RECTAL DAILY PRN
Status: DISCONTINUED | OUTPATIENT
Start: 2022-09-01 | End: 2022-09-02 | Stop reason: HOSPADM

## 2022-08-30 RX ORDER — METOCLOPRAMIDE 10 MG/1
10 TABLET ORAL EVERY 6 HOURS PRN
Status: DISCONTINUED | OUTPATIENT
Start: 2022-08-30 | End: 2022-09-02 | Stop reason: HOSPADM

## 2022-08-30 RX ADMIN — PHENYLEPHRINE HYDROCHLORIDE 100 MCG: 10 INJECTION INTRAVENOUS at 10:21

## 2022-08-30 RX ADMIN — KETOROLAC TROMETHAMINE 30 MG: 30 INJECTION, SOLUTION INTRAMUSCULAR; INTRAVENOUS at 23:42

## 2022-08-30 RX ADMIN — SODIUM CITRATE AND CITRIC ACID MONOHYDRATE 30 ML: 500; 334 SOLUTION ORAL at 10:04

## 2022-08-30 RX ADMIN — METOCLOPRAMIDE 10 MG: 5 INJECTION, SOLUTION INTRAMUSCULAR; INTRAVENOUS at 14:28

## 2022-08-30 RX ADMIN — SODIUM CHLORIDE, SODIUM LACTATE, POTASSIUM CHLORIDE, CALCIUM CHLORIDE AND DEXTROSE MONOHYDRATE: 5; 600; 310; 30; 20 INJECTION, SOLUTION INTRAVENOUS at 14:34

## 2022-08-30 RX ADMIN — MORPHINE SULFATE 0.15 MG: 1 INJECTION, SOLUTION EPIDURAL; INTRATHECAL; INTRAVENOUS at 10:15

## 2022-08-30 RX ADMIN — Medication 2.5 MG: at 10:32

## 2022-08-30 RX ADMIN — BUPIVACAINE HYDROCHLORIDE IN DEXTROSE 1.2 ML: 7.5 INJECTION, SOLUTION SUBARACHNOID at 10:15

## 2022-08-30 RX ADMIN — KETOROLAC TROMETHAMINE 30 MG: 30 INJECTION, SOLUTION INTRAMUSCULAR; INTRAVENOUS at 17:24

## 2022-08-30 RX ADMIN — Medication 5 MG: at 11:09

## 2022-08-30 RX ADMIN — Medication 340 ML/HR: at 10:38

## 2022-08-30 RX ADMIN — PROCHLORPERAZINE EDISYLATE 10 MG: 5 INJECTION INTRAMUSCULAR; INTRAVENOUS at 16:32

## 2022-08-30 RX ADMIN — ONDANSETRON 4 MG: 2 INJECTION INTRAMUSCULAR; INTRAVENOUS at 12:45

## 2022-08-30 RX ADMIN — Medication 5 MG: at 10:43

## 2022-08-30 RX ADMIN — PHENYLEPHRINE HYDROCHLORIDE 0.5 MCG/KG/MIN: 10 INJECTION INTRAVENOUS at 10:15

## 2022-08-30 RX ADMIN — ACETAMINOPHEN 975 MG: 325 TABLET ORAL at 10:03

## 2022-08-30 RX ADMIN — PHENYLEPHRINE HYDROCHLORIDE 100 MCG: 10 INJECTION INTRAVENOUS at 10:18

## 2022-08-30 RX ADMIN — PHENYLEPHRINE HYDROCHLORIDE 100 MCG: 10 INJECTION INTRAVENOUS at 11:19

## 2022-08-30 RX ADMIN — PHENYLEPHRINE HYDROCHLORIDE 50 MCG: 10 INJECTION INTRAVENOUS at 10:29

## 2022-08-30 RX ADMIN — SODIUM CHLORIDE, POTASSIUM CHLORIDE, SODIUM LACTATE AND CALCIUM CHLORIDE: 600; 310; 30; 20 INJECTION, SOLUTION INTRAVENOUS at 10:03

## 2022-08-30 RX ADMIN — SODIUM CHLORIDE, POTASSIUM CHLORIDE, SODIUM LACTATE AND CALCIUM CHLORIDE: 600; 310; 30; 20 INJECTION, SOLUTION INTRAVENOUS at 10:27

## 2022-08-30 RX ADMIN — ONDANSETRON 4 MG: 2 INJECTION INTRAMUSCULAR; INTRAVENOUS at 10:38

## 2022-08-30 RX ADMIN — Medication 2 G: at 10:12

## 2022-08-30 RX ADMIN — Medication 2.5 MG: at 10:29

## 2022-08-30 ASSESSMENT — ACTIVITIES OF DAILY LIVING (ADL)
ADLS_ACUITY_SCORE: 18

## 2022-08-30 NOTE — PLAN OF CARE
The pt arrived to the unit at 1350, initial teaching and safety measures were reviewed with the pt and her spouse.  VSS, she denies pain, and she's had IV Zofran and Reglan for nausea/emesis.  She plans to begin pumping and visit her son in the NICU (via w/c).  Will continue to monitor and assist

## 2022-08-30 NOTE — BRIEF OP NOTE
[facility]    Brief Operative Note    Pre-operative diagnosis: Supervision of other high risk pregnancies, unspecified trimester [O09.899], pregnancy at 37 weeks, severe polyhydramnios, history of previous  section  Post-operative diagnosis Same as pre-operative diagnosis    Procedure: Procedure(s):  REPEAT  SECTION  Surgeon: Surgeon(s) and Role:     * Rylie Miles MD - Primary  Anesthesia: Spinal with duramorph   Quantitative Blood Loss: 550 ml    Drains: cedillo  Specimens: * No specimens in log *  Findings:   8-10 male, vtx, Apgars 9, 9. 3100 ml amniotic fluid. Uterus, tubes and ovaries normal.  Complications: None.  Implants: * No implants in log *

## 2022-08-30 NOTE — OP NOTE
Procedure Date: 2022    PREOPERATIVE DIAGNOSES:    1.  Pregnancy at 37 weeks' gestation.  2.  Severe polyhydramnios.  3.  History of previous  section.    POSTOPERATIVE DIAGNOSES:    1.  Pregnancy at 37 weeks' gestation.  2.  Severe polyhydramnios.  3.  History of previous  section.    PROCEDURE:  Repeat low segment transverse  section.    ANESTHESIA:  Spinal with Duramorph.    ESTIMATED BLOOD LOSS:  550 mL.    COMPLICATIONS:  None apparent.    DRAINS:  Chaney catheter.    SURGEON:  Rylie Miles MD.    PREOPERATIVE STATUS:  The patient is a 37-year-old  2, now para 2-0-0-2, who was admitted this morning for repeat  section.  Pregnancy was complicated by history of herpes, for which she was on prophylaxis at 36 weeks with no lesions.  History of anxiety and depression, no current medications.  Polyhydramnios diagnosed at 25 weeks' gestation.  Initial anatomy ultrasound showed an echogenic mass in the area of the stomach of uncertain etiology.  Followup ultrasound at 24 weeks showed this to persist.  Maternal fetal medicine ultrasound then showed a baby with estimated fetal weight 98th percentile, moderate polyhydramnios of 30 cm, possible double bubble versus constriction of the fetal stomach, but no mass.  VediryrF14 test was normal.  Followup ultrasounds subsequently showed no further abnormalities noted in the stomach, but polyhydramnios became severe at 30 weeks.  She had weekly biophysical profiles and growth ultrasounds with maternal fetal medicine.  At 35 weeks, the ALY was 42.  The patient was uncomfortable, but had no respiratory symptoms and amnioreduction was not considered.  At 36 weeks, amniotic fluid index was 45 cm.  Maternal fetal medicine recommended proceeding to a repeat  section in the 37th week due to the severe polyhydramnios and having NICU evaluate the baby at delivery for any possible conditions causing swallowing issues.  The patient  had a history of previous  for arrest of dilation.  She was counseled and requested repeat .  Risks, benefits and alternatives were discussed and she understood.    OPERATIVE FINDINGS:  8 pound 10 ounce male from the vertex presentation, Apgars 9 at one minute and 9 at five minutes.  There was 3100 mL of amniotic fluid.  The uterus, tubes, and ovaries were normal.    OPERATIVE PROCEDURE:  The patient was taken to the operating room and spinal anesthetic placed.  She was then prepped and draped in the left lateral tilt position with pneumoboots on and Chaney catheter in place.  She received Ancef 2 grams on call to the OR.  After a timeout was performed, adequate anesthesia was confirmed.  A Pfannenstiel skin incision was made through the previous scar, carried down to the fascia.  Fascia was dissected off the rectus muscle superiorly and inferiorly and there was significant adhesions there.  Rectus muscles were already  in the midline.  The peritoneum was elevated, entered.  Bladder flap was taken down off the lower uterine segment.  Ultrasound had been performed in the preoperative area showing the vertex floating on the patient's left lower quadrant.  Vertex was palpated through the lower uterine segment and was brought to the midline and was held there with fundal and abdominal light pressure by the surgical assist.  The lower uterine segment was then incised transversely and membranes exposed.  The incision was extended with cephalocaudad spread.  Small hole was then made in the amniotic sac and clear fluid resulted and this was gradually suctioned in an attempt to control the flow of fluid.  After letting out a significant amount of fluid the amniotic opening was stretched to expose the head and there was a large gush of fluid.  Vertex however, remained presenting, and with gradual fundal pressure, the vertex was gradually delivered through the incision without difficulty.  The  remainder of the baby followed easily.  The baby was immediately vigorous and placed on the maternal chest.  After delay of 1 minute, the cord was doubly clamped and cut and the infant taken to the warmer.  The placenta was manually removed from the uterus.  The endometrial cavity wiped free of membrane and clot.  Uterine tone was excellent.  Pitocin was given IV immediately following delivery of the baby.  The cut edge of the uterus reapproximated in 2 layers, the first a running locked suture of 0 Vicryl, followed by running imbricating suture of 0 Vicryl.  One additional figure-of-X was placed in the midline for hemostasis.  Tubes and ovaries inspected and gutters were wiped free of blood and clot.  The bladder flap was inspected and lightly cauterized for hemostasis.  Peritoneum was closed with running suture of 2-0 Vicryl.  Rectus muscles were inspected and lightly cauterized for hemostasis.  Fascia was closed with running suture of 0 Vicryl from each end of the midline.  Subcutaneous tissue was irrigated and lightly cauterized for hemostasis.  Skin was closed with 4-0 undyed Vicryl subcuticular suture and Steri-Strips placed.  Both mother and baby were doing well following delivery.  All sponge, needle and instrument counts were correct.    Rylie Miles MD        D: 2022   T: 2022   MT: [id number]    Name:     BILL BOLAÑOS  MRN:      [id number]        Account:        [id number]   :      1985           Procedure Date: 2022     Document: [id number]

## 2022-08-30 NOTE — PLAN OF CARE
After visiting her  son in the NICU, Tracy was transferred to room 431 via cart. Report was given to Myrtle TREVINO RN who will now assume care.

## 2022-08-30 NOTE — PLAN OF CARE
"Patient taking scheduled Toradol for mild discomfort, unable to take Tylenol at this time due to nausea and vomiting. Meds given for nausea, patient states \"slightly better\". Patient stood at side of bed, became light headed, plan is to try again soon. Patient states \"I want to visit infant in NICU soon\". Patient started pumping. Encouraged patient to call with needs/questions. Call light within reach, will continue to monitor.   "

## 2022-08-30 NOTE — ANESTHESIA PREPROCEDURE EVALUATION
Anesthesia Pre-Procedure Evaluation    Patient: Tracy Dalton   MRN: [id number] : 1985        Procedure : Procedure(s):  REPEAT  SECTION          Past Medical History:   Diagnosis Date     Depressive disorder     and anxiety      Past Surgical History:   Procedure Laterality Date     wisdom teeth        Allergies   Allergen Reactions     Penicillins       Social History     Tobacco Use     Smoking status: Former Smoker     Start date: 2008     Quit date: 2008     Years since quittin.6     Smokeless tobacco: Never Used     Tobacco comment: 1 ppw   Substance Use Topics     Alcohol use: Not Currently     Comment: 2-3 drinks on weekends      Wt Readings from Last 1 Encounters:   20 76.7 kg (169 lb)        Anesthesia Evaluation            ROS/MED HX  ENT/Pulmonary:    (-) sleep apnea   Neurologic:       Cardiovascular:       METS/Exercise Tolerance:     Hematologic:       Musculoskeletal:       GI/Hepatic:     (+) GERD,     Renal/Genitourinary:       Endo:       Psychiatric/Substance Use:     (+) psychiatric history anxiety and depression     Infectious Disease:       Malignancy:       Other:            Physical Exam    Airway        Mallampati: II   TM distance: > 3 FB   Neck ROM: full   Mouth opening: > 3 cm    Respiratory Devices and Support         Dental  no notable dental history         Cardiovascular   cardiovascular exam normal          Pulmonary   pulmonary exam normal                OUTSIDE LABS:  CBC:   Lab Results   Component Value Date    WBC 9.0 2019    WBC 7.0 2018    HGB 11.6 (L) 2022    HGB 10.0 (L) 2019    HCT 35.0 2019    HCT 40.0 2018     2019     2018     BMP: No results found for: NA, POTASSIUM, CHLORIDE, CO2, BUN, CR, GLC  COAGS: No results found for: PTT, INR, FIBR  POC: No results found for: BGM, HCG, HCGS  HEPATIC:   Lab Results   Component Value Date    ALBUMIN 4.0 2018    PROTTOTAL  7.2 07/09/2018    ALT 46 07/09/2018    AST 31 07/09/2018    ALKPHOS 55 07/09/2018    BILITOTAL 0.5 07/09/2018     OTHER:   Lab Results   Component Value Date    TSH 1.70 07/09/2018    SED 5 10/22/2015       Anesthesia Plan    ASA Status:  2   NPO Status:  NPO Appropriate    Anesthesia Type: Spinal.              Consents    Anesthesia Plan(s) and associated risks, benefits, and realistic alternatives discussed. Questions answered and patient/representative(s) expressed understanding.    - Discussed:     - Discussed with:  Patient         Postoperative Care    Pain management: IV analgesics, intrathecal morphine.   PONV prophylaxis: Ondansetron (or other 5HT-3)     Comments:                THOMPSON AVILES MD

## 2022-08-30 NOTE — ANESTHESIA CARE TRANSFER NOTE
Patient: Tracy Dalton    Procedure: Procedure(s):  REPEAT  SECTION       Diagnosis: Supervision of other high risk pregnancies, unspecified trimester [O09.899]  Diagnosis Additional Information: No value filed.    Anesthesia Type:   Spinal     Note:    Oropharynx: oropharynx clear of all foreign objects  Level of Consciousness: awake  Oxygen Supplementation: room air    Independent Airway: airway patency satisfactory and stable  Dentition: dentition unchanged  Vital Signs Stable: post-procedure vital signs reviewed and stable  Report to RN Given: handoff report given  Patient transferred to: Labor and Delivery    Handoff Report: Identifed the Patient, Identified the Reponsible Provider, Reviewed the pertinent medical history, Discussed the surgical course, Reviewed Intra-OP anesthesia mangement and issues during anesthesia, Set expectations for post-procedure period and Allowed opportunity for questions and acknowledgement of understanding      Vitals:  Vitals Value Taken Time   BP     Temp     Pulse     Resp     SpO2         Electronically Signed By: CYRIL Rossi CRNA  2022  11:27 AM

## 2022-08-30 NOTE — ANESTHESIA POSTPROCEDURE EVALUATION
Patient: Tracy Dalton    Procedure: Procedure(s):  REPEAT  SECTION       Anesthesia Type:  Spinal    Note:  Disposition: Admission   Postop Pain Control: Uneventful            Sign Out: Well controlled pain   PONV: No   Neuro/Psych: Uneventful            Sign Out: Acceptable/Baseline neuro status   Airway/Respiratory: Uneventful            Sign Out: Acceptable/Baseline resp. status   CV/Hemodynamics: Uneventful            Sign Out: Acceptable CV status; No obvious hypovolemia; No obvious fluid overload   Other NRE: NONE   DID A NON-ROUTINE EVENT OCCUR? No           Last vitals:  Vitals Value Taken Time   /76 22 1215   Temp     Pulse 71 22 1215   Resp 16 22 1215   SpO2 98 % 22 1200       Electronically Signed By: THOMPSON AVILES MD  2022  1:07 PM

## 2022-08-30 NOTE — ANESTHESIA PROCEDURE NOTES
Intrathecal Procedure Note    Pre-Procedure   Staff -        Anesthesiologist:  Pascual Bloom MD       Performed By: anesthesiologist       Location: OR       Pre-Anesthestic Checklist: patient identified, IV checked, risks and benefits discussed, informed consent, monitors and equipment checked and pre-op evaluation  Timeout:       Correct Patient: Yes        Correct Procedure: Yes        Correct Site: Yes        Correct Position: Yes   Procedure Documentation  Procedure: intrathecal       Patient Position: sitting       Skin prep: Betadine       Insertion Site: L4-5. (midline approach).       Spinal Needle Type: Serge tip       Introducer used       Introducer: 20 G       # of attempts: 1 and  # of redirects:     Assessment/Narrative         Paresthesias: No.       CSF fluid: clear.    Medication(s) Administered   0.75% Hyperbaric Bupivacaine (Intrathecal) - Intrathecal   1.2 mL - 8/30/2022 10:15:00 AM  Morphine PF 1 mg/mL (Intrathecal) - Intrathecal   0.15 mg - 8/30/2022 10:15:00 AM   Comments:  1% lidocaine for localization.  No complications.

## 2022-08-31 LAB — HGB BLD-MCNC: 10.2 G/DL (ref 11.7–15.7)

## 2022-08-31 PROCEDURE — 250N000011 HC RX IP 250 OP 636: Performed by: OBSTETRICS & GYNECOLOGY

## 2022-08-31 PROCEDURE — 250N000013 HC RX MED GY IP 250 OP 250 PS 637: Performed by: OBSTETRICS & GYNECOLOGY

## 2022-08-31 PROCEDURE — 120N000012 HC R&B POSTPARTUM

## 2022-08-31 PROCEDURE — 36415 COLL VENOUS BLD VENIPUNCTURE: CPT | Performed by: OBSTETRICS & GYNECOLOGY

## 2022-08-31 PROCEDURE — 90707 MMR VACCINE SC: CPT | Performed by: OBSTETRICS & GYNECOLOGY

## 2022-08-31 PROCEDURE — 85018 HEMOGLOBIN: CPT | Performed by: OBSTETRICS & GYNECOLOGY

## 2022-08-31 PROCEDURE — 90471 IMMUNIZATION ADMIN: CPT | Performed by: OBSTETRICS & GYNECOLOGY

## 2022-08-31 PROCEDURE — 3E0134Z INTRODUCTION OF SERUM, TOXOID AND VACCINE INTO SUBCUTANEOUS TISSUE, PERCUTANEOUS APPROACH: ICD-10-PCS | Performed by: OBSTETRICS & GYNECOLOGY

## 2022-08-31 RX ADMIN — ACETAMINOPHEN 975 MG: 325 TABLET ORAL at 05:55

## 2022-08-31 RX ADMIN — SENNOSIDES AND DOCUSATE SODIUM 1 TABLET: 50; 8.6 TABLET ORAL at 22:25

## 2022-08-31 RX ADMIN — SENNOSIDES AND DOCUSATE SODIUM 1 TABLET: 50; 8.6 TABLET ORAL at 08:45

## 2022-08-31 RX ADMIN — IBUPROFEN 800 MG: 400 TABLET, FILM COATED ORAL at 18:13

## 2022-08-31 RX ADMIN — ACETAMINOPHEN 975 MG: 325 TABLET ORAL at 12:11

## 2022-08-31 RX ADMIN — OXYCODONE HYDROCHLORIDE 5 MG: 5 TABLET ORAL at 18:15

## 2022-08-31 RX ADMIN — IBUPROFEN 800 MG: 400 TABLET, FILM COATED ORAL at 12:11

## 2022-08-31 RX ADMIN — KETOROLAC TROMETHAMINE 30 MG: 30 INJECTION, SOLUTION INTRAMUSCULAR; INTRAVENOUS at 05:55

## 2022-08-31 RX ADMIN — ACETAMINOPHEN 975 MG: 325 TABLET ORAL at 18:13

## 2022-08-31 RX ADMIN — OXYCODONE HYDROCHLORIDE 5 MG: 5 TABLET ORAL at 22:25

## 2022-08-31 RX ADMIN — MEASLES, MUMPS, AND RUBELLA VIRUS VACCINE LIVE 0.5 ML: 1000; 12500; 1000 INJECTION, POWDER, LYOPHILIZED, FOR SUSPENSION SUBCUTANEOUS at 12:10

## 2022-08-31 ASSESSMENT — ACTIVITIES OF DAILY LIVING (ADL)
ADLS_ACUITY_SCORE: 18
ADLS_ACUITY_SCORE: 18
ADLS_ACUITY_SCORE: 21
ADLS_ACUITY_SCORE: 18
ADLS_ACUITY_SCORE: 21
ADLS_ACUITY_SCORE: 21
ADLS_ACUITY_SCORE: 18

## 2022-08-31 NOTE — PLAN OF CARE
VS WDL. Fundus firm, midline, 1 cm below the U. Initially nauseous at start of shift, declined any antiemetics. By end of shift patient was able to tolerate a regular diet and drink fluids. Tylenol and Toradol used for pain management with good results. Belly binder used for support during ambulation. Patient moving well with standby assist. Voided once since catheter removal. Able to visit infant once overnight. Pumping every 3 hours.

## 2022-08-31 NOTE — PROGRESS NOTES
"OB Post-op  Section Progress Note POD# 1    S:  Patient doing well.  Pain well controlled with oral pain medication.  Ambulating.  Tolerating advancing diet.  No N/V.  Passing flatus.  Voiding.  Bleeding is normal.  Breastfeeding.  Baby in NICU for observation.     O:  BP 97/57   Pulse 70   Temp 98.4  F (36.9  C) (Oral)   Resp 16   Ht 1.727 m (5' 8\")   Wt 88 kg (194 lb 0.1 oz)   LMP 2021   SpO2 99%   Breastfeeding Unknown   BMI 29.50 kg/m    UOP- 24hr 2150, Since  ml    Gen- A&O, NAD  Abd- soft, non tender, non distended  Fundus- firm, non tender  Incision/Dressing- C/D/I  Ext- non-tender, no edema. PCD's in place and functioning    Hemoglobin   Date Value Ref Range Status   2022 11.6 (L) 11.7 - 15.7 g/dL Final   2019 10.0 (L) 11.7 - 15.7 g/dL Final     A POS   Rubella not immune    A/P:  37 year old  POD# 1 s/p repeat LTCS with polyhydramnios.  Doing well post op.  Baby in NICU for observation.     1.  Routine post-op cares  2.  Analgesia adequate  3.  Ambulate   4.  Discharge is planned in 1-2 days, and may be affected by baby's status  5.  The plan of care was discussed with the patient and her partner. .  She expressed understanding and agreement.   6. Rubella vaccine post partum    Singh Hinojosa MD  2022  8:11 AM   "

## 2022-08-31 NOTE — PLAN OF CARE
VSS, pumping for baby in NICU.  Fundus is firm at U/1, scant flow, no clots.  Incision is approximated, steri-strips intact, no drainage.  Pain controlled with Tylenol, Ibuprofen and Oxy.  Independent with cares.   is at bedside and supportive.  Pt visits baby in NICU frequently.  Will continue to monitor and support.

## 2022-09-01 PROCEDURE — 120N000012 HC R&B POSTPARTUM

## 2022-09-01 PROCEDURE — 250N000013 HC RX MED GY IP 250 OP 250 PS 637: Performed by: OBSTETRICS & GYNECOLOGY

## 2022-09-01 RX ADMIN — ACETAMINOPHEN 975 MG: 325 TABLET ORAL at 11:58

## 2022-09-01 RX ADMIN — SENNOSIDES AND DOCUSATE SODIUM 1 TABLET: 50; 8.6 TABLET ORAL at 07:53

## 2022-09-01 RX ADMIN — OXYCODONE HYDROCHLORIDE 5 MG: 5 TABLET ORAL at 02:20

## 2022-09-01 RX ADMIN — SENNOSIDES AND DOCUSATE SODIUM 1 TABLET: 50; 8.6 TABLET ORAL at 21:06

## 2022-09-01 RX ADMIN — OXYCODONE HYDROCHLORIDE 5 MG: 5 TABLET ORAL at 10:05

## 2022-09-01 RX ADMIN — IBUPROFEN 800 MG: 400 TABLET, FILM COATED ORAL at 18:11

## 2022-09-01 RX ADMIN — ACETAMINOPHEN 975 MG: 325 TABLET ORAL at 18:11

## 2022-09-01 RX ADMIN — ACETAMINOPHEN 975 MG: 325 TABLET ORAL at 00:23

## 2022-09-01 RX ADMIN — OXYCODONE HYDROCHLORIDE 5 MG: 5 TABLET ORAL at 15:30

## 2022-09-01 RX ADMIN — OXYCODONE HYDROCHLORIDE 5 MG: 5 TABLET ORAL at 06:11

## 2022-09-01 RX ADMIN — ACETAMINOPHEN 975 MG: 325 TABLET ORAL at 06:09

## 2022-09-01 RX ADMIN — IBUPROFEN 800 MG: 400 TABLET, FILM COATED ORAL at 11:58

## 2022-09-01 RX ADMIN — IBUPROFEN 800 MG: 400 TABLET, FILM COATED ORAL at 00:23

## 2022-09-01 RX ADMIN — IBUPROFEN 800 MG: 400 TABLET, FILM COATED ORAL at 06:10

## 2022-09-01 ASSESSMENT — ACTIVITIES OF DAILY LIVING (ADL)
ADLS_ACUITY_SCORE: 18

## 2022-09-01 NOTE — LACTATION NOTE
Initial Lactation visit with Tracy. Tracy's baby boy is in the NICU, per Tracy doing better and has been able to breastfeed a few times! Tracy has been pumping. Recommend continuing to pump after each feeding or at least every 3 hours. Let Tracy know she's doing a great job.  Discussed hands on pumping. [facility] settings with Tracy and encouraged use of initiate mode.     Explained benefits of holding baby skin on skin to help promote better breastfeeding outcomes. Meseret has a pump for home use. Will revisit as needed. Tracy very appreciative of visit and Lactation support.    Susanne Conde, RN-C, IBCLC, MNN, PHN, BSN

## 2022-09-01 NOTE — PLAN OF CARE
Patient states incisional pain controlled with po meds. Incision noted to have a steri strip that was coming off, replaced on the right side. Tolerating general diet. Voiding without difficulty. Going down to visit baby in NICU via wheelchair with  and pumping every 3 hours. Encouraged patient to call if needing assistance with cares.

## 2022-09-01 NOTE — PROGRESS NOTES
"OB CS post op note  POD# 2    S:  Patient doing well.  Pain controlled with oxycodone, tylenol, ibuprofen.  Bertram reg diet,  Voiding, independently ambulating,  Bleeding scant.  Pumping, as baby is in NICU.  Baby is doing well, off cpap.    O:  /76 (BP Location: Left arm, Patient Position: Semi-Tijerina's, Cuff Size: Adult Regular)   Pulse 61   Temp 98.1  F (36.7  C) (Oral)   Resp 16   Ht 1.727 m (5' 8\")   Wt 88 kg (194 lb 0.1 oz)   LMP 2021   SpO2 98%   Breastfeeding Unknown   BMI 29.50 kg/m    No intake or output data in the 24 hours ending 22 0829   Gen- A&O, NAD  PULM: CTAB  CV: RRR  Abd- Non-tender, fundus firm at umbilicus  Incision: C/D/I with steri strips in place  Ext- non-tender, neg edema    Hemoglobin   Date Value Ref Range Status   2022 10.2 (L) 11.7 - 15.7 g/dL Final   2019 10.0 (L) 11.7 - 15.7 g/dL Final     A POS  Rubella Immune    A/P: 37 year old  POD# 2 s/p repeat LTCS    1.  Routine post-partum cares.   - Pain meds as ordered   - Diet as tolerated   - Ambulate    - IS as needed   - lactation support  2.  Anticipate d/c home on POD# 3 or 4.    Adelina Price CNM  2022  8:29 AM    "

## 2022-09-02 ENCOUNTER — LACTATION ENCOUNTER (OUTPATIENT)
Age: 37
End: 2022-09-02

## 2022-09-02 VITALS
BODY MASS INDEX: 29.4 KG/M2 | DIASTOLIC BLOOD PRESSURE: 71 MMHG | SYSTOLIC BLOOD PRESSURE: 114 MMHG | HEART RATE: 81 BPM | HEIGHT: 68 IN | OXYGEN SATURATION: 98 % | TEMPERATURE: 98.2 F | RESPIRATION RATE: 16 BRPM | WEIGHT: 194 LBS

## 2022-09-02 PROCEDURE — 250N000013 HC RX MED GY IP 250 OP 250 PS 637: Performed by: OBSTETRICS & GYNECOLOGY

## 2022-09-02 RX ORDER — IBUPROFEN 800 MG/1
800 TABLET, FILM COATED ORAL EVERY 6 HOURS
COMMUNITY
Start: 2022-09-02

## 2022-09-02 RX ORDER — ACETAMINOPHEN 325 MG/1
975 TABLET ORAL EVERY 6 HOURS
COMMUNITY
Start: 2022-09-02

## 2022-09-02 RX ORDER — OXYCODONE HYDROCHLORIDE 5 MG/1
5 TABLET ORAL EVERY 4 HOURS PRN
Qty: 10 TABLET | Refills: 0 | Status: SHIPPED | OUTPATIENT
Start: 2022-09-02

## 2022-09-02 RX ORDER — AMOXICILLIN 250 MG
1-2 CAPSULE ORAL 2 TIMES DAILY PRN
Qty: 90 TABLET | Refills: 0 | Status: SHIPPED | OUTPATIENT
Start: 2022-09-02

## 2022-09-02 RX ADMIN — ACETAMINOPHEN 975 MG: 325 TABLET ORAL at 07:23

## 2022-09-02 RX ADMIN — IBUPROFEN 800 MG: 400 TABLET, FILM COATED ORAL at 07:23

## 2022-09-02 RX ADMIN — IBUPROFEN 800 MG: 400 TABLET, FILM COATED ORAL at 00:45

## 2022-09-02 RX ADMIN — ACETAMINOPHEN 975 MG: 325 TABLET ORAL at 00:45

## 2022-09-02 ASSESSMENT — ACTIVITIES OF DAILY LIVING (ADL)
ADLS_ACUITY_SCORE: 18

## 2022-09-02 NOTE — PLAN OF CARE
Vitally stable. Independent in room. Goes to NICU to visit baby boy regularly. Pumping and some breastfeeding. Denies pain. Voiding adequately.

## 2022-09-02 NOTE — PROGRESS NOTES
"OB Post-op  Section Progress Note POD# 3    S:  Patient doing well.  Pain well controlled with oral pain medication.  Ambulating independently.  Tolerating normal diet.  No N/V.  Passing flatus.  Voiding.  Bleeding like a light/moderate period.  Pumping for baby in NICU, doing well, infant's PIV removed last night.    O:  /71   Pulse 81   Temp 98.2  F (36.8  C) (Oral)   Resp 16   Ht 1.727 m (5' 8\")   Wt 88 kg (194 lb 0.1 oz)   LMP 2021   SpO2 98%   Breastfeeding Unknown   BMI 29.50 kg/m    Gen- A&O, NAD  Lungs- well perfused on room air  Abd- soft, non-tender, no rebound or guarding, fundus firm at umbilicus  Incision- JESUS with steri strips intact  Ext- non-tender, no edema.     Hemoglobin   Date Value Ref Range Status   2022 10.2 (L) 11.7 - 15.7 g/dL Final   2019 10.0 (L) 11.7 - 15.7 g/dL Final     A POS   Rubella immune    A/P:  37 year old  POD# 3 s/p RLTCS     1.  Routine post-op cares  2.  Will order oxycodone tabs PRN for at home.   3.  Plan to discharge today  4.  Continue to encourage pumping every 2-3 hours to promote milk supply.  5.  Follow up in clinic in at 2 and 6 weeks postpartum, sooner as needed.     CYRIL Ayers CNM  2022  8:19 AM   "

## 2022-09-02 NOTE — PLAN OF CARE
Tracy feeling well this morning; a little emotional as her 3-year old at home had an accident at home last night and requiring some dental care today. Planning to discharge today; infant in NICU and has been pumping approximately every 3 hours. Infant has feeding tube and attempting breastfeeds. Encourage skin to skin/feeding when able and pumping 8-10x/day. She should start seeing increase in milk volume soon. Fundus firm with scant-light bing lochia. Showered this morning and a few steri strips peeling off; removed and replaced half of the steri strips on her right side. 1+ edema bilateral lower extremities.Tolerating regular diet. Declines stool softeners this morning.  Planning to visit in NICU. Anticipate discharge early afternoon.

## 2022-09-02 NOTE — LACTATION NOTE
This note was copied from a baby's chart.  Taking care of this family in NICU today and offered lactation information. Baby in NICU for feeding issues and some hypotonia. Stable hypoglycemia. Mom pumping and getting 10-20 mls on day 3 pumping every 3 hours. Mom put baby to breast at 1100 feeding - good latch for a couple minutes and then sleepy. Transferred 1 ml. Mom teary today. States she would prefer to pump and bottle for now to make it easier for Fred to get volumes. Reinforced we will support whatever she decides and can always breastfeed later and the best way to do so is to encourage a good milk supply. Fred had bottled 15 mls earlier in shift using CAROL ANN level 0. Mom also discharged today and plans to return later today. Will follow as able.    ARPITA Flores RNC, IBCLC

## 2022-09-06 NOTE — DISCHARGE SUMMARY
Service Date: 2022  Discharge Date: 2022      HISTORY AND HOSPITAL COURSE:  The patient is a 37-year-old  2, now para 2-0-0-2, who was admitted on 2022 at 37 weeks' gestation for repeat  section for severe polyhydramnios.  Pregnancy was complicated by history of herpes, for which she was on prophylaxis at 36 weeks with no lesions.  History of anxiety and depression with no current medications.  Polyhydramnios was diagnosed at 25 weeks' gestation.  Initial anatomy ultrasound showed an echogenic mass in the area of the stomach of uncertain etiology.  Followup ultrasound at 24 weeks showed it to persist.  Maternal fetal medicine ultrasound then showed baby with estimated fetal weight in the 98th percentile and moderate polyhydramnios and possible double bubble of the stomach versus constriction.  [id number] test was normal.  Followup ultrasound subsequently showed no further abnormalities noted in the stomach.  The polyhydramnios became severe at 30 weeks.  She has had weekly biophysical profiles and growth ultrasounds with maternal fetal medicine.  At 35 weeks, ALY was 42.  At 36 weeks, it was 45.  She had no respiratory symptoms and amnio reduction was not done.  Maternal fetal medicine recommended proceeding to repeat  section at 37 weeks due to severe polyhydramnios.  The patient has a history of previous  for arrest of dilation and was counseled and requested a repeat .  On 2022, she underwent repeat low segment transverse  section under spinal anesthetic with Duramorph.  Findings were an 8 pound 10 ounce male from the vertex presentation, Apgars 9 at 1 minute and 9 at 5 minutes.  There was 3100 mL of amniotic fluid.  The uterus, tubes, and ovaries were normal.  Postoperatively, the patient did well with postoperative hemoglobin of 10.2.  Baby was in the NICU for observation with some hypoglycemia and initial respiratory distress requiring  CPAP, which was discontinued fairly quickly.  The patient remained afebrile.  She was discharged home on postop day #3 with Tylenol, ibuprofen and oxycodone for p.r.n. use.  She will follow up in the office in two weeks and in six weeks postpartum.  Baby remained stable in the NICU.    Rylie Miles MD        D: 2022   T: 2022   MT: [id number]    Name:     BILL BOLAÑOS  MRN:      [id number]        Account:      [id number]   :      1985           Service Date: 2022                                  Discharge Date: 2022     Document: [id number]

## 2022-09-11 ENCOUNTER — HEALTH MAINTENANCE LETTER (OUTPATIENT)
Age: 37
End: 2022-09-11

## 2022-10-20 PROCEDURE — 87624 HPV HI-RISK TYP POOLED RSLT: CPT

## 2022-10-20 PROCEDURE — G0145 SCR C/V CYTO,THINLAYER,RESCR: HCPCS

## 2022-10-21 ENCOUNTER — LAB REQUISITION (OUTPATIENT)
Dept: LAB | Facility: CLINIC | Age: 37
End: 2022-10-21

## 2022-10-21 DIAGNOSIS — Z12.4 ENCOUNTER FOR SCREENING FOR MALIGNANT NEOPLASM OF CERVIX: ICD-10-CM

## 2022-10-24 LAB
BKR LAB AP GYN ADEQUACY: NORMAL
BKR LAB AP GYN INTERPRETATION: NORMAL
BKR LAB AP HPV REFLEX: NORMAL
BKR LAB AP LMP: NORMAL
BKR LAB AP PREVIOUS ABNORMAL: NORMAL
PATH REPORT.COMMENTS IMP SPEC: NORMAL
PATH REPORT.COMMENTS IMP SPEC: NORMAL
PATH REPORT.RELEVANT HX SPEC: NORMAL

## 2022-10-27 LAB
HUMAN PAPILLOMA VIRUS 16 DNA: NEGATIVE
HUMAN PAPILLOMA VIRUS 18 DNA: NEGATIVE
HUMAN PAPILLOMA VIRUS FINAL DIAGNOSIS: NORMAL
HUMAN PAPILLOMA VIRUS OTHER HR: NEGATIVE

## 2023-06-03 ENCOUNTER — HEALTH MAINTENANCE LETTER (OUTPATIENT)
Age: 38
End: 2023-06-03

## 2023-11-13 NOTE — PROGRESS NOTES
"OB Progress Note  2019  1:32 PM    S:  Breathing through contractions, feels like they are stronger, using birthing ball.      O:  /87   Pulse 81   Temp 98  F (36.7  C) (Temporal)   Resp 16   Ht 1.727 m (5' 8\")   Wt 86.6 kg (191 lb)   LMP 2018   Breastfeeding? No   BMI 29.04 kg/m    EFM: baseline 145, accelerations present, neg decelerations, mod variability; Category I  [place]:  Ctx q2-3 min, last 60-90 seconds  SVE:  4/70%/-2  Membranes: intact    A/P:  34 year old  @41w6d, early labor, Cat I, GBS positive,   1.  Routine care  2.  Prophylaxis started for GBS, continue per protocol  3.  Stop oral cytotec, if contractions space out, low dose Pitocin  4. Consider AROM when vertex lower  5. Eval prn    Adelina Price CNM    " [place] Pregnancy And Lactation Text: This medication is Pregnancy Category C and it isn't known if it is safe during pregnancy. It is unknown if it is excreted in breast milk.

## 2024-07-13 ENCOUNTER — HEALTH MAINTENANCE LETTER (OUTPATIENT)
Age: 39
End: 2024-07-13

## 2025-07-19 ENCOUNTER — HEALTH MAINTENANCE LETTER (OUTPATIENT)
Age: 40
End: 2025-07-19

## (undated) DEVICE — GLOVE PROTEXIS W/NEU-THERA 6.5  2D73TE65

## (undated) DEVICE — SU VICRYL 2-0 CT-1 27" J339H

## (undated) DEVICE — STPL SKIN PROXIMATE 35 WIDE PMW35

## (undated) DEVICE — SOL WATER IRRIG 1000ML BOTTLE 07139-09

## (undated) DEVICE — ESU GROUND PAD UNIVERSAL W/O CORD

## (undated) DEVICE — GLOVE PROTEXIS W/NEU-THERA 7.5  2D73TE75

## (undated) DEVICE — SU VICRYL 0 CT-1 27" J340H

## (undated) DEVICE — SU VICRYL 3-0 SH 27" J316H

## (undated) DEVICE — SU VICRYL 3-0 CT-1 36" J338H

## (undated) DEVICE — PACK C-SECTION LF PL15OTA83B

## (undated) DEVICE — SOL NACL 0.9% IRRIG 1000ML BOTTLE 07138-09

## (undated) DEVICE — GLOVE PROTEXIS POWDER FREE 6.5 ORTHOPEDIC 2D73ET65

## (undated) DEVICE — SU MONOCRYL 0 CT-1 36" UND Y946H

## (undated) DEVICE — CATH TRAY FOLEY 16FR BARDEX W/DRAIN BAG STATLOCK 300316A

## (undated) DEVICE — LINEN C-SECTION 5415

## (undated) DEVICE — SUCTION CANISTER MEDIVAC LINER 3000ML W/LID 65651-530

## (undated) DEVICE — PREP CHLORAPREP 26ML TINTED ORANGE  260815

## (undated) DEVICE — BLADE CLIPPER 4406

## (undated) DEVICE — SU VICRYL 0 CT 36" J358H

## (undated) RX ORDER — OXYTOCIN 10 [USP'U]/ML
INJECTION, SOLUTION INTRAMUSCULAR; INTRAVENOUS
Status: DISPENSED
Start: 2022-08-30

## (undated) RX ORDER — ONDANSETRON 2 MG/ML
INJECTION INTRAMUSCULAR; INTRAVENOUS
Status: DISPENSED
Start: 2022-08-30

## (undated) RX ORDER — GLYCOPYRROLATE 0.2 MG/ML
INJECTION, SOLUTION INTRAMUSCULAR; INTRAVENOUS
Status: DISPENSED
Start: 2022-08-30

## (undated) RX ORDER — OXYTOCIN/0.9 % SODIUM CHLORIDE 30/500 ML
PLASTIC BAG, INJECTION (ML) INTRAVENOUS
Status: DISPENSED
Start: 2022-08-30

## (undated) RX ORDER — MORPHINE SULFATE 1 MG/ML
INJECTION, SOLUTION EPIDURAL; INTRATHECAL; INTRAVENOUS
Status: DISPENSED
Start: 2022-08-30